# Patient Record
Sex: FEMALE | Race: WHITE | NOT HISPANIC OR LATINO | URBAN - METROPOLITAN AREA
[De-identification: names, ages, dates, MRNs, and addresses within clinical notes are randomized per-mention and may not be internally consistent; named-entity substitution may affect disease eponyms.]

---

## 2024-07-01 ENCOUNTER — APPOINTMENT (OUTPATIENT)
Dept: RADIOLOGY | Facility: CLINIC | Age: 74
End: 2024-07-01
Payer: MEDICARE

## 2024-07-01 ENCOUNTER — APPOINTMENT (OUTPATIENT)
Dept: ORTHOPEDIC SURGERY | Facility: CLINIC | Age: 74
End: 2024-07-01
Payer: MEDICARE

## 2024-07-01 DIAGNOSIS — G89.29 CHRONIC PAIN OF RIGHT KNEE: Primary | ICD-10-CM

## 2024-07-01 DIAGNOSIS — M25.561 CHRONIC PAIN OF RIGHT KNEE: Primary | ICD-10-CM

## 2025-02-26 ENCOUNTER — APPOINTMENT (OUTPATIENT)
Dept: ORTHOPEDIC SURGERY | Facility: CLINIC | Age: 75
End: 2025-02-26
Payer: MEDICARE

## 2025-02-27 ENCOUNTER — OFFICE VISIT (OUTPATIENT)
Dept: ORTHOPEDIC SURGERY | Facility: CLINIC | Age: 75
End: 2025-02-27
Payer: MEDICARE

## 2025-02-27 ENCOUNTER — HOSPITAL ENCOUNTER (OUTPATIENT)
Dept: RADIOLOGY | Facility: CLINIC | Age: 75
Discharge: HOME | End: 2025-02-27
Payer: MEDICARE

## 2025-02-27 VITALS — HEIGHT: 60 IN | WEIGHT: 118 LBS | BODY MASS INDEX: 23.16 KG/M2

## 2025-02-27 DIAGNOSIS — M25.561 CHRONIC PAIN OF RIGHT KNEE: ICD-10-CM

## 2025-02-27 DIAGNOSIS — R73.9 HYPERGLYCEMIA: ICD-10-CM

## 2025-02-27 DIAGNOSIS — G89.29 CHRONIC PAIN OF RIGHT KNEE: Primary | ICD-10-CM

## 2025-02-27 DIAGNOSIS — M25.561 CHRONIC PAIN OF RIGHT KNEE: Primary | ICD-10-CM

## 2025-02-27 DIAGNOSIS — G89.29 CHRONIC PAIN OF RIGHT KNEE: ICD-10-CM

## 2025-02-27 PROCEDURE — 73564 X-RAY EXAM KNEE 4 OR MORE: CPT | Mod: RT

## 2025-02-27 PROCEDURE — 3008F BODY MASS INDEX DOCD: CPT | Performed by: STUDENT IN AN ORGANIZED HEALTH CARE EDUCATION/TRAINING PROGRAM

## 2025-02-27 PROCEDURE — 99205 OFFICE O/P NEW HI 60 MIN: CPT | Performed by: STUDENT IN AN ORGANIZED HEALTH CARE EDUCATION/TRAINING PROGRAM

## 2025-02-27 ASSESSMENT — PAIN SCALES - GENERAL: PAINLEVEL_OUTOF10: 5 - MODERATE PAIN

## 2025-02-27 ASSESSMENT — PAIN - FUNCTIONAL ASSESSMENT: PAIN_FUNCTIONAL_ASSESSMENT: 0-10

## 2025-02-27 NOTE — PROGRESS NOTES
HOWARD/TKA Related Summary           L hip: N  L knee: N  R hip: N  R knee: N  Lumbar surgery or significant pathology: N            CC/SUBJECTIVE/HPI            PCP: No primary care provider on file.  Referring provider: No ref. provider found  Bharati Bender is a 74 y.o. female presenting for R knee pain.  This pain has been going on for many years, and she has been following at a neighboring hospital for a variety of conservative treatments.  Unfortunately, these are no longer providing relief.  At this point, the pain is limiting activities of daily living and hobbies.  She enjoys hiking and exercising and is not able to do these without significant pain.    R knee  Symptoms  Pain: 7/10  Onset: chronic/gradual  Duration: >2yrs  Location: all over  Quality: catch/lock and radiate  Limitations: morning stiffness, pain after activity, limp, night pain, difficulty with stairs, difficulty in/out of chair/car, and difficulty with socks/shoes/clothes  Ambulation limit due to pain: >500ft but hurts  Other symptoms: none  Treatment  Tried: activity modification, bracing, PT, home exercises, ice/heat, topical gel (ie Voltaren), OTCs, and CSI  Most recent injection <3mo ago? Y (1/16/2025)  Assistive device: none  Treatment attempted for >2yrs and is no longer effective            HISTORIES (System Generated and Pt Reported on Form Today)       Dental  Pt reported: No active issues     PMH  Pt reported: Denies heart/lung/kidney/liver issues, DM, stroke, seizure, bariatric surgery, anticoag, MRSA, cancer, personal/familial coagulopathies except: none in addition to below  System generated (PMH, problem list both included since EMR change caused discrepancies): No past medical history on file. There is no problem list on file for this patient.  Review of outside records  Esophageal Reflux  Enthesopathy of Wrist and Carpus  Primary Localized Osteoarthrosis, Hand  Hiatal Hernia  Other Seborrheic Keratosis  Mixed  Hyperlipidemia  Primary Osteoarthritis of First Carpometacarpal Joint of Left Hand  Obstructive Sleep Apnea  Change in Bowel Habits  Diverticulosis of Large Intestine Without Hemorrhage  Irritable Bowel Syndrome  Osteopenia  Osteoporosis  Primary Osteoarthritis of Right Knee  Acquired Genu Valgum of Right Knee  Nuclear Senile Cataract of Both Eyes  Cortical Age-Related Cataract, Bilateral  Papilloma of Left Lower Eyelid  Preop Examination    PSH  Pt reported: Per above.   System generated: No past surgical history on file.  Review of outside records: Noncontributory    Family Hx  Pt reported clot/coagulopathies: none  Review of outside records: Noncontributory    Social Hx  Pt reported substance use: N  System generated:    Review of outside records: Social EtOH, otherwise negative    Allergies  Pt reported (meds, metals): Bactrim (rash)  System generated: Not on File  Review of outside records: Sulfa, pollen.    Current Meds  System generated: No current outpatient medications on file.  Review of outside records: Voltaren gel, omeprazole, lovastatin    ROS: Neg except HPI            OBJECTIVE            Physical exam  There is no height or weight on file to calculate BMI.  Gen/psych: NAD, conversational, appropriate    Ambulation  Gait: Mildly antalgic  Assistive device: none  Spine  Lumbar tenderness: neg  Limited ROM: neg, with no radiation or pain with flex/ex, lateral bending  SLR test: neg    Focused MSK exam: R  Knee  Skin/incision: normal  Effusion: mild  Alignment: valgus (partially correctable)  Knee tenderness: diffuse  Crepitation: moderate  Extension: passive flexion contracture 0-5° and active extension lag 0°  Flexion: 115°  Anterior drawer: stable  Posterior drawer: stable  Varus/Valgus in extension: stable  Varus/Valgus in flexion: stable  Referred pain to knee with hip 90° flexion and 45° ER/IR: neg  Neurovascular    Strength: 5/5 hip/knee/ankle flexion and extension  Sensory (L2-S1): SILT  throughout lower extremity  Edema/stasis: no pitting edema  Pulse: DP 1+, PT 1+      Imaging  2/27/2025 R knee radiographs ([facility], WB AP/lateral, WB AP flexion) on my read: Joint space narrowing (medial tibiofemoral mild, lateral tibiofemoral severe, patellofemoral moderate) with osteophytes and sclerosis. Limb alignment valgus (partially correctable).            ASSESSMENT & PLAN           Patient verbalized understanding of below A&P. All questions answered.  R knee DJD  Differential includes radicular pain from spine or referred pain from hip. H&P most consistent with knee origin.  General information  Evaluation, imaging, diagnosis, and treatment options (conservative and surgical as well as risks, benefits, recovery, and outcomes) discussed. Conservative options should be maximized and include activity modification, bracing, weight loss, PT, home exercise, local pain control (ice, heat, topicals), OTCs, and assistive devices. Once exhausted, injections may provide relief. If these fail to improve pain, function, and quality of life, elective arthroplasty may be an option.  Shared decision making   Correction of valgus deformity carries risk of nerve palsy.  Considering R primary TKA. Given extensive conservative treatments tried, continued pain, and effect on quality of life, I agree this would be a reasonable decision. Would be a good arthroplasty candidate..  Follow-up: As needed  PMH, PSH, other considerations discussed  Osteoporosis  Bactrim allergy (rash)  SDD candidate  Occupation:   Hobbies: Reading  PCP: Not assigned

## 2025-03-01 LAB
ALBUMIN SERPL-MCNC: 4.3 G/DL (ref 3.6–5.1)
EST. AVERAGE GLUCOSE BLD GHB EST-MCNC: 120 MG/DL
EST. AVERAGE GLUCOSE BLD GHB EST-SCNC: 6.6 MMOL/L
HBA1C MFR BLD: 5.8 % OF TOTAL HGB
HCT VFR BLD AUTO: 41.3 % (ref 35–45)
HGB BLD-MCNC: 13.9 G/DL (ref 11.7–15.5)

## 2025-04-01 PROBLEM — M17.11 ARTHRITIS OF RIGHT KNEE: Status: ACTIVE | Noted: 2025-08-13

## 2025-05-02 ENCOUNTER — EVALUATION (OUTPATIENT)
Dept: PHYSICAL THERAPY | Facility: CLINIC | Age: 75
End: 2025-05-02
Payer: MEDICARE

## 2025-05-02 DIAGNOSIS — M25.561 CHRONIC PAIN OF RIGHT KNEE: Primary | ICD-10-CM

## 2025-05-02 DIAGNOSIS — G89.29 CHRONIC PAIN OF RIGHT KNEE: Primary | ICD-10-CM

## 2025-05-02 DIAGNOSIS — M17.11 ARTHRITIS OF RIGHT KNEE: ICD-10-CM

## 2025-05-02 DIAGNOSIS — M62.81 MUSCLE WEAKNESS: ICD-10-CM

## 2025-05-02 PROCEDURE — 97161 PT EVAL LOW COMPLEX 20 MIN: CPT | Mod: GP | Performed by: PHYSICAL THERAPIST

## 2025-05-02 PROCEDURE — 97110 THERAPEUTIC EXERCISES: CPT | Mod: GP | Performed by: PHYSICAL THERAPIST

## 2025-05-02 ASSESSMENT — ENCOUNTER SYMPTOMS
LOSS OF SENSATION IN FEET: 0
DEPRESSION: 0
OCCASIONAL FEELINGS OF UNSTEADINESS: 0

## 2025-05-02 NOTE — PROGRESS NOTES
Physical Therapy    Physical Therapy Evaluation and Treatment      Patient Name: Bharati Bender  MRN: [id number]  Today's Date: 5/2/2025  Time Entry:   Time Calculation  Start Time: 0900  Stop Time: 0945  Time Calculation (min): 45 min  PT Evaluation Time Entry  PT Evaluation (Low) Time Entry: 30  PT Therapeutic Procedures Time Entry  Therapeutic Exercise Time Entry: 10     Insurance: Medicare and MMO [place], no auth  Medicare Certification: 5/2/25 - 7/31/25  Visit #1    Assessment:  Bharati is a 74 y.o. female presenting with chronic R knee pain secondary to severe arthritic changes. Exam shows R genu valgus deformity, moderate edema, decreased R>L lower extremity strength, decreased length in R hamstrings and mild gait impairments. Pt would benefit from skilled PT to address these impairments in preparation for TKA surgery in August.      Plan:  OP PT Plan  Treatment/Interventions: Therapeutic exercises, Manual therapy, Dry needling, Electrical stimulation, Cryotherapy, Hot pack, Education/ Instruction, Vasopneumatic device  PT Plan: Skilled PT  PT Frequency:  (1 time per week to every other week)  Duration: 6-8 visits  Onset Date: 04/01/25  Certification Period Start Date: 05/02/25  Certification Period End Date: 07/31/25  Number of Treatments Authorized: [place]  Rehab Potential: Good  Plan of Care Agreement: Patient      Problem List Items Addressed This Visit           ICD-10-CM    Arthritis of right knee M17.11    Relevant Orders    Follow Up In Physical Therapy    Chronic pain of right knee - Primary M25.561, G89.29    Relevant Orders    Follow Up In Physical Therapy    Muscle weakness M62.81    Relevant Orders    Follow Up In Physical Therapy       General:  Reason for Referral: R knee pain  Referred By: Ton Sarabia MD  Preferred Learning Style: verbal, visual, written    Subjective   Bharati c/o chronic R knee pain for at least the last 4-5 years secondary to arthritic changes. She saw ortho  back in February, had x-rays which showed “Severe lateral compartment osteoarthritis. Genu valgum deformity.” She elected TKA and is scheduled for surgery on 8/13/25. She has been using ice constantly. She is taking Tylenol most of the time and ibuprofen when severe. Pain is aggravated with prolonged sitting and she changes positions frequently at night secondary to pain/discomfort. She does not feel particularly limited in any specific activity but has found ways to compensate and feels she does things slower. PMHx: sleep apnea, osteopenia    Pt Goals: “Get the leg stronger to make therapy easier for after surgery.”    Precautions:  Precautions  STEADI Fall Risk Score (The score of 4 or more indicates an increased risk of falling): 0    Pain:  R lateral knee 2/10 currently, 10/10 at worst, pain can be achy, sharp, dull    Home Living:  Lives with , flight to stairs down to laundry, flight of stairs to second level, bed/bath on main level    Prior Level of Function:  Independent in all ADLs. Pt is retired.    Objective   Palpation: (-) TTP to R knee structures    Observation: R genu valgus deformity, moderate edema    Gait: slightly decreased R LE stance time    Single leg balance: R- 15 seconds; L- 3 seconds    Knee AROM (R/L in degrees): 4-0-140; 3-0-140    MMT (R/L):   Iliopsoas- 3+/5; 4+/5  Hip Abd- 3+/5; 4/5  Glute max- 4/5; 3+/5  Quadriceps- 4+/5; 5/5  Hamstrings- 4+/5; 4+/5    Length tests (R/L in degrees):  90/90 hamstrings- (40) / (30)    Outcome Measures:  LEFS: 62/80    Treatments:  Therapeutic Exercise:  SLR x 10 R  SAQ x 10 w/ 5 sec hold R  LSLR x 10 R  Bridges x 10 w/ 5 sec hold    EDUCATION:  Access Code: [email or web address]  URL: [email or web address]  Date: 05/02/2025  Prepared by: Augustin Ruano    Exercises  - Active Straight Leg Raise with Quad Set  - 1 x daily - 7 x weekly - 1-3 sets - 10 reps  - Supine Knee Extension Strengthening  - 1 x daily - 7 x weekly - 1-3 sets - 10  reps - 5 seconds hold  - Sidelying Hip Abduction  - 1 x daily - 7 x weekly - 1-3 sets - 10 reps  - Bridge  - 1 x daily - 7 x weekly - 1-3 sets - 10 reps - 5 seconds hold    Goals:  Active       PT Problem       PT Goal 1       Start:  05/02/25    Expected End:  07/01/25       Increase R quadriceps and hamstrings MMT to 5/5 and B iliopsoas, hip abd and glute max MMT to at least 4+/5.         PT Goal 2       Start:  05/02/25    Expected End:  07/01/25       Increase length in R hamstrings by 10 degrees.         PT Goal 3       Start:  05/02/25    Expected End:  07/01/25       Pt will amb with improved R LE stance time.         PT Goal 4       Start:  05/02/25    Expected End:  07/01/25       Pt will report 50% improvement in R knee pain to improve sitting, sleeping and general ADLs.         PT Goal 5       Start:  05/02/25    Expected End:  07/01/25       Improve LEFS by 5 points or more.         PT Goal 6       Start:  05/02/25    Expected End:  07/01/25       Pt will demonstrate independence with HEP.

## 2025-05-21 ENCOUNTER — TREATMENT (OUTPATIENT)
Dept: PHYSICAL THERAPY | Facility: CLINIC | Age: 75
End: 2025-05-21
Payer: MEDICARE

## 2025-05-21 DIAGNOSIS — G89.29 CHRONIC PAIN OF RIGHT KNEE: Primary | ICD-10-CM

## 2025-05-21 DIAGNOSIS — M25.561 CHRONIC PAIN OF RIGHT KNEE: Primary | ICD-10-CM

## 2025-05-21 DIAGNOSIS — M17.11 ARTHRITIS OF RIGHT KNEE: ICD-10-CM

## 2025-05-21 DIAGNOSIS — M62.81 MUSCLE WEAKNESS: ICD-10-CM

## 2025-05-21 PROCEDURE — 97110 THERAPEUTIC EXERCISES: CPT | Mod: GP | Performed by: PHYSICAL THERAPIST

## 2025-05-21 NOTE — PROGRESS NOTES
"Physical Therapy    Physical Therapy Treatment    Patient Name: Bharati Bender  MRN: [id number]  Today's Date: 5/21/2025    Time Entry:   Time Calculation  Start Time: 1530  Stop Time: 1615  Time Calculation (min): 45 min     PT Therapeutic Procedures Time Entry  Therapeutic Exercise Time Entry: 40       Insurance: Medicare and MMO [place], [place] auth  Medicare Certification: 5/2/25 - 7/31/25  Visit #2    Assessment:  Pt needs cues to reset quads between reps of SLR. Min form corrections needed with all home exercises.    Plan:  Standing 4-way hip.    Problem List Items Addressed This Visit           ICD-10-CM    Arthritis of right knee M17.11    Chronic pain of right knee - Primary M25.561, G89.29    Muscle weakness M62.81       Subjective    \"Knee is sore depending on what I'm doing. Feels stiff/achy today I think because of the weather.\"    Precautions: osteopenia  STEADI Fall Risk Score (The score of 4 or more indicates an increased risk of falling): 0    Pain  R knee 3/10    Objective   From Eval:   MMT (R/L):   Iliopsoas- 3+/5; 4+/5  Hip Abd- 3+/5; 4/5  Glute max- 4/5; 3+/5  Quadriceps- 4+/5; 5/5  Hamstrings- 4+/5; 4+/5    Treatments:  Therapeutic Exercise:  SRC x 5 mins  SLR 2x10 R  SAQ 2x10 w/ 5 sec hold R  Bridges 2x10 w/ 5 sec hold  LSLR 2x10 R  TKE, green band, 2x10 w/ 5 sec hold R  Leg press 50# 2x10  HSC 11# 2x10    Goals:  Active       PT Problem       PT Goal 1       Start:  05/02/25    Expected End:  07/01/25       Increase R quadriceps and hamstrings MMT to 5/5 and B iliopsoas, hip abd and glute max MMT to at least 4+/5.         PT Goal 2       Start:  05/02/25    Expected End:  07/01/25       Increase length in R hamstrings by 10 degrees.         PT Goal 3       Start:  05/02/25    Expected End:  07/01/25       Pt will amb with improved R LE stance time.         PT Goal 4       Start:  05/02/25    Expected End:  07/01/25       Pt will report 50% improvement in R knee pain to improve " sitting, sleeping and general ADLs.         PT Goal 5       Start:  05/02/25    Expected End:  07/01/25       Improve LEFS by 5 points or more.         PT Goal 6       Start:  05/02/25    Expected End:  07/01/25       Pt will demonstrate independence with HEP.

## 2025-05-29 ENCOUNTER — TREATMENT (OUTPATIENT)
Dept: PHYSICAL THERAPY | Facility: CLINIC | Age: 75
End: 2025-05-29
Payer: MEDICARE

## 2025-05-29 DIAGNOSIS — M25.561 CHRONIC PAIN OF RIGHT KNEE: Primary | ICD-10-CM

## 2025-05-29 DIAGNOSIS — M17.11 ARTHRITIS OF RIGHT KNEE: ICD-10-CM

## 2025-05-29 DIAGNOSIS — M62.81 MUSCLE WEAKNESS: ICD-10-CM

## 2025-05-29 DIAGNOSIS — G89.29 CHRONIC PAIN OF RIGHT KNEE: Primary | ICD-10-CM

## 2025-05-29 PROCEDURE — 97110 THERAPEUTIC EXERCISES: CPT | Mod: GP | Performed by: PHYSICAL THERAPIST

## 2025-05-29 NOTE — PROGRESS NOTES
Physical Therapy    Physical Therapy Treatment    Patient Name: Bharati Bender  MRN: [id number]  Today's Date: 5/29/2025    Time Entry:   Time Calculation  Start Time: 0915  Stop Time: 1000  Time Calculation (min): 45 min     PT Therapeutic Procedures Time Entry  Therapeutic Exercise Time Entry: 40    Insurance: Medicare and MMO [place], [place]  Medicare Certification: 5/2/25 - 7/31/25  Visit #3    Assessment:  Recheck of R iliopsoas MMT shows improvement in strength since initial visit. Pt needs constant UE support for balance/stability during standing 4-way hip.    Plan:  Seated hamstring stretch, step ups.    Problem List Items Addressed This Visit           ICD-10-CM    Arthritis of right knee M17.11    Chronic pain of right knee - Primary M25.561, G89.29    Muscle weakness M62.81       Subjective    Pt reports stiffness this morning. She iced before she came here today.    Precautions: osteopenia  STEADI Fall Risk Score (The score of 4 or more indicates an increased risk of falling): 0    Pain  R knee 4/10    Objective   MMT (R/L):   Iliopsoas- 4/5; 4+/5    Treatments:  Therapeutic Exercise:  SRC x 5 mins  SLR 2x10 R  SAQ 2x10 w/ 5 sec hold R  Bridges w/ hip abd, red band, 2x10 w/ 5 sec hold  LSLR 2x10 R  TKE, green band, 2x10 w/ 5 sec hold R  Leg press 50# 2x10  HSC 11# 2x10  Standing 4-way hip, red band, x 10 ea R    OP EDUCATION:  Added band hip abd to bridges for HEP.    Goals:  Active       PT Problem       PT Goal 1       Start:  05/02/25    Expected End:  07/01/25       Increase R quadriceps and hamstrings MMT to 5/5 and B iliopsoas, hip abd and glute max MMT to at least 4+/5.         PT Goal 2       Start:  05/02/25    Expected End:  07/01/25       Increase length in R hamstrings by 10 degrees.         PT Goal 3       Start:  05/02/25    Expected End:  07/01/25       Pt will amb with improved R LE stance time.         PT Goal 4       Start:  05/02/25    Expected End:  07/01/25       Pt  will report 50% improvement in R knee pain to improve sitting, sleeping and general ADLs.         PT Goal 5       Start:  05/02/25    Expected End:  07/01/25       Improve LEFS by 5 points or more.         PT Goal 6       Start:  05/02/25    Expected End:  07/01/25       Pt will demonstrate independence with HEP.

## 2025-06-04 ENCOUNTER — TREATMENT (OUTPATIENT)
Dept: PHYSICAL THERAPY | Facility: CLINIC | Age: 75
End: 2025-06-04
Payer: MEDICARE

## 2025-06-04 DIAGNOSIS — M62.81 MUSCLE WEAKNESS: ICD-10-CM

## 2025-06-04 DIAGNOSIS — M17.11 ARTHRITIS OF RIGHT KNEE: ICD-10-CM

## 2025-06-04 DIAGNOSIS — G89.29 CHRONIC PAIN OF RIGHT KNEE: ICD-10-CM

## 2025-06-04 DIAGNOSIS — M25.561 CHRONIC PAIN OF RIGHT KNEE: ICD-10-CM

## 2025-06-04 PROCEDURE — 97110 THERAPEUTIC EXERCISES: CPT | Mod: GP,CQ

## 2025-06-04 NOTE — PROGRESS NOTES
"Physical Therapy    Physical Therapy Treatment    Patient Name: Bharati Bender  MRN: [id number]  Today's Date: 6/4/2025    Time Entry:   Time Calculation  Start Time: 0145  Stop Time: 0220  Time Calculation (min): 35 min     PT Therapeutic Procedures Time Entry  Therapeutic Exercise Time Entry: 35    Insurance: Medicare and MMO [place], [place]  Medicare Certification: 5/2/25 - 7/31/25  Visit #4    Assessment:  Recheck of R iliopsoas MMT shows improvement in strength since initial visit. Pt needs constant UE support for balance/stability during standing 4-way hip.    Plan:  Squats w/ resistance band for H abd    Problem List Items Addressed This Visit           ICD-10-CM    Arthritis of right knee M17.11    Chronic pain of right knee M25.561, G89.29    Muscle weakness M62.81       Subjective    \"I had an appointment this morning at [place], and I had to walk a lot.  I came home and iced it a few minutes, but it's still more sore than last time.\"    Precautions: osteopenia  STEADI Fall Risk Score (The score of 4 or more indicates an increased risk of falling): 0    Pain  R knee 6/10, 2/10 EOS    Objective   MMT (R/L):   Iliopsoas- 4/5; 4+/5    Treatments:  Therapeutic Exercise:  QS x 20 ea  SLR 2x10 R  SAQ 2x10 w/ 5 sec hold R, not today  Bridges w/ hip abd, red band, 2x10 w/ 5 sec hold  LSLR 2x10 R  HSC x 20, green TB  TKE, black band, 2x10 w/ 5 sec hold R  Leg press 50# 2x10  HSC 11# 2x10  Standing 4-way hip, red band, x 10 ea R  4\" step ups x 10 ea  SRB x 5 min    OP EDUCATION:  Added band hip abd to bridges for HEP.    Goals:  Active       PT Problem       PT Goal 1       Start:  05/02/25    Expected End:  07/01/25       Increase R quadriceps and hamstrings MMT to 5/5 and B iliopsoas, hip abd and glute max MMT to at least 4+/5.         PT Goal 2       Start:  05/02/25    Expected End:  07/01/25       Increase length in R hamstrings by 10 degrees.         PT Goal 3       Start:  05/02/25    " Expected End:  07/01/25       Pt will amb with improved R LE stance time.         PT Goal 4       Start:  05/02/25    Expected End:  07/01/25       Pt will report 50% improvement in R knee pain to improve sitting, sleeping and general ADLs.         PT Goal 5       Start:  05/02/25    Expected End:  07/01/25       Improve LEFS by 5 points or more.         PT Goal 6       Start:  05/02/25    Expected End:  07/01/25       Pt will demonstrate independence with HEP.

## 2025-06-11 ENCOUNTER — TREATMENT (OUTPATIENT)
Dept: PHYSICAL THERAPY | Facility: CLINIC | Age: 75
End: 2025-06-11
Payer: MEDICARE

## 2025-06-11 DIAGNOSIS — M25.561 CHRONIC PAIN OF RIGHT KNEE: ICD-10-CM

## 2025-06-11 DIAGNOSIS — M17.11 ARTHRITIS OF RIGHT KNEE: ICD-10-CM

## 2025-06-11 DIAGNOSIS — G89.29 CHRONIC PAIN OF RIGHT KNEE: ICD-10-CM

## 2025-06-11 DIAGNOSIS — M62.81 MUSCLE WEAKNESS: ICD-10-CM

## 2025-06-11 PROCEDURE — 97110 THERAPEUTIC EXERCISES: CPT | Mod: GP,CQ

## 2025-06-11 NOTE — PROGRESS NOTES
"Physical Therapy    Physical Therapy Treatment    Patient Name: Bharati Bender  MRN: [id number]  Today's Date: 6/11/2025    Time Entry:   Time Calculation  Start Time: 0923  Stop Time: 1001  Time Calculation (min): 38 min     PT Therapeutic Procedures Time Entry  Therapeutic Exercise Time Entry: 38    Insurance: Medicare and MMO [place], [place]  Medicare Certification: 5/2/25 - 7/31/25  Visit #5    Assessment:  Pt did well w/ resisted walking in cable column, VC's for neutral LE positioning on Nu Step    Plan:  Squats w/ resistance band for H abd    Problem List Items Addressed This Visit           ICD-10-CM    Arthritis of right knee M17.11    Chronic pain of right knee M25.561, G89.29    Muscle weakness M62.81       Subjective    \"R knee is back down to baseline, 2/10.  Some days it feels stronger and I have gone for a walk after I do the ex's and felt OK.\"    Precautions: osteopenia  STEADI Fall Risk Score (The score of 4 or more indicates an increased risk of falling): 0    Pain  R knee 2/10, 0/10 EOS    Objective   MMT (R/L):   Iliopsoas- 4/5; 4+/5    Treatments:  Therapeutic Exercise:  QS x 20 ea  SLR 2x10 R  SAQ 2x10 w/ 5 sec hold R, not today  Bridges w/ hip abd, green band, 2x10 w/ 5 sec hold  LSLR 2x10 R  HSC x 20, green TB  LAQ #2 x 20  TKE, black band, 2x10 w/ 5 sec hold R  Leg press 60# 2x10  HSC 11# 3x10  Cable column 2p x 5 ea dir  4\" step ups x 20 ea  Nu Step 1.5 x 5 min    OP EDUCATION:  Added band hip abd to bridges for HEP.    Goals:  Active       PT Problem       PT Goal 1       Start:  05/02/25    Expected End:  07/01/25       Increase R quadriceps and hamstrings MMT to 5/5 and B iliopsoas, hip abd and glute max MMT to at least 4+/5.         PT Goal 2       Start:  05/02/25    Expected End:  07/01/25       Increase length in R hamstrings by 10 degrees.         PT Goal 3       Start:  05/02/25    Expected End:  07/01/25       Pt will amb with improved R LE stance time.         PT " Goal 4       Start:  05/02/25    Expected End:  07/01/25       Pt will report 50% improvement in R knee pain to improve sitting, sleeping and general ADLs.         PT Goal 5       Start:  05/02/25    Expected End:  07/01/25       Improve LEFS by 5 points or more.         PT Goal 6       Start:  05/02/25    Expected End:  07/01/25       Pt will demonstrate independence with HEP.

## 2025-06-18 ENCOUNTER — TREATMENT (OUTPATIENT)
Dept: PHYSICAL THERAPY | Facility: CLINIC | Age: 75
End: 2025-06-18
Payer: MEDICARE

## 2025-06-18 DIAGNOSIS — M17.11 ARTHRITIS OF RIGHT KNEE: ICD-10-CM

## 2025-06-18 DIAGNOSIS — M62.81 MUSCLE WEAKNESS: ICD-10-CM

## 2025-06-18 DIAGNOSIS — M25.561 CHRONIC PAIN OF RIGHT KNEE: ICD-10-CM

## 2025-06-18 DIAGNOSIS — G89.29 CHRONIC PAIN OF RIGHT KNEE: ICD-10-CM

## 2025-06-18 PROCEDURE — 97110 THERAPEUTIC EXERCISES: CPT | Mod: GP,CQ

## 2025-06-18 NOTE — PROGRESS NOTES
"Physical Therapy    Physical Therapy Treatment    Patient Name: Bharati Bender  MRN: [id number]  Today's Date: 6/18/2025    Time Entry:   Time Calculation  Start Time: 0916  Stop Time: 0955  Time Calculation (min): 39 min     PT Therapeutic Procedures Time Entry  Therapeutic Exercise Time Entry: 39    Insurance: Medicare and MMO [place], [place] auth  Medicare Certification: 5/2/25 - 7/31/25  Visit #6    Assessment:  Pt was able to add resistance to PRE's    Plan:  Squats w/ resistance band for H abd    Problem List Items Addressed This Visit           ICD-10-CM    Arthritis of right knee M17.11    Chronic pain of right knee M25.561, G89.29    Muscle weakness M62.81       Subjective    \"R knee is less uncomfortable at night, I am sleeping better.\"    Precautions: osteopenia  STEADI Fall Risk Score (The score of 4 or more indicates an increased risk of falling): 0    Pain  R knee 2/10, 0/10 EOS    Objective   MMT (R/L):   Iliopsoas- 4/5; 4+/5    Treatments:  Therapeutic Exercise:  QS x 20 ea  SLR #2 2x10 R  SAQ 2x10 w/ 5 sec hold R, #2  Bridges w/ hip abd, green band, 2x10 w/ 5 sec hold  LSLR, #2 2x10 R  HSC x 20, green TB  LAQ #2 x 20  Green TB lat amb x 3, monster walks x 3  TKE, black band, 2x10 w/ 5 sec hold R, not today  Leg press 60# 2x10, occupied  HSC 11# 3x10  KE #11 3x10  Cable Column 2p fwd/retro x 5 ea  Sit to stand black bench x 20  Nu Step 1.5 x 5 min, not today    OP EDUCATION:  Added band hip abd to bridges for HEP.    Goals:  Active       PT Problem       PT Goal 1       Start:  05/02/25    Expected End:  07/01/25       Increase R quadriceps and hamstrings MMT to 5/5 and B iliopsoas, hip abd and glute max MMT to at least 4+/5.         PT Goal 2       Start:  05/02/25    Expected End:  07/01/25       Increase length in R hamstrings by 10 degrees.         PT Goal 3       Start:  05/02/25    Expected End:  07/01/25       Pt will amb with improved R LE stance time.         PT Goal 4       " Start:  05/02/25    Expected End:  07/01/25       Pt will report 50% improvement in R knee pain to improve sitting, sleeping and general ADLs.         PT Goal 5       Start:  05/02/25    Expected End:  07/01/25       Improve LEFS by 5 points or more.         PT Goal 6       Start:  05/02/25    Expected End:  07/01/25       Pt will demonstrate independence with HEP.

## 2025-06-25 ENCOUNTER — TREATMENT (OUTPATIENT)
Dept: PHYSICAL THERAPY | Facility: CLINIC | Age: 75
End: 2025-06-25
Payer: MEDICARE

## 2025-06-25 DIAGNOSIS — M25.561 CHRONIC PAIN OF RIGHT KNEE: ICD-10-CM

## 2025-06-25 DIAGNOSIS — G89.29 CHRONIC PAIN OF RIGHT KNEE: ICD-10-CM

## 2025-06-25 DIAGNOSIS — M17.11 ARTHRITIS OF RIGHT KNEE: ICD-10-CM

## 2025-06-25 DIAGNOSIS — M62.81 MUSCLE WEAKNESS: ICD-10-CM

## 2025-06-25 PROCEDURE — 97110 THERAPEUTIC EXERCISES: CPT | Mod: GP,CQ

## 2025-06-25 NOTE — PROGRESS NOTES
"Physical Therapy    Physical Therapy Treatment    Patient Name: Bharati Bender  MRN: [id number]  Today's Date: 6/25/2025    Time Entry:   Time Calculation  Start Time: 0915  Stop Time: 0940  Time Calculation (min): 25 min     PT Therapeutic Procedures Time Entry  Therapeutic Exercise Time Entry: 25    Insurance: Medicare and [employer] [place], no auth  Medicare Certification: 5/2/25 - 7/31/25  Visit #7    Assessment:  Decreased rx 2` another commitment. Pt was able to add lateral steps to cable column.    Plan:  Increase weight on HSC machine  Squats w/ resistance band for H abd    Problem List Items Addressed This Visit           ICD-10-CM    Arthritis of right knee M17.11    Chronic pain of right knee M25.561, G89.29    Muscle weakness M62.81       Subjective    \"I need to leave early, there is an emergency meeting for my brother in a nursing home.\"    Precautions: osteopenia  STEADI Fall Risk Score (The score of 4 or more indicates an increased risk of falling): 0    Pain  R knee 2/10, 0/10 EOS    Objective   MMT (R/L):   Iliopsoas- 4/5; 4+/5    Treatments:  Therapeutic Exercise:  Not today:  QS x 20 ea  SAQ 2x10 w/ 5 sec hold R, #2  Bridges w/ hip abd, green band, 2x10 w/ 5 sec hold  LSLR, #2 2x10 R  HSC x 20, green TB  LAQ #2 x 20  Green TB lat amb x 3, monster walks x 3  TKE, black band, 2x10 w/ 5 sec hold R, not today    Today:  SLR in ER #2 x 20  Leg press 60# 2x10  HSC 11# 2x10  KE #11 2x10  Cable Column 2p fwd/retro/laterals x 7 ea  Sit to stand black bench x 20  SRB x 5 min, seat 5    OP EDUCATION:  Added band hip abd to bridges for HEP.    Goals:  Active       PT Problem       PT Goal 1       Start:  05/02/25    Expected End:  07/01/25       Increase R quadriceps and hamstrings MMT to 5/5 and B iliopsoas, hip abd and glute max MMT to at least 4+/5.         PT Goal 2       Start:  05/02/25    Expected End:  07/01/25       Increase length in R hamstrings by 10 degrees.         PT Goal 3       " Start:  05/02/25    Expected End:  07/01/25       Pt will amb with improved R LE stance time.         PT Goal 4       Start:  05/02/25    Expected End:  07/01/25       Pt will report 50% improvement in R knee pain to improve sitting, sleeping and general ADLs.         PT Goal 5       Start:  05/02/25    Expected End:  07/01/25       Improve LEFS by 5 points or more.         PT Goal 6       Start:  05/02/25    Expected End:  07/01/25       Pt will demonstrate independence with HEP.

## 2025-07-02 ENCOUNTER — APPOINTMENT (OUTPATIENT)
Dept: PHYSICAL THERAPY | Facility: CLINIC | Age: 75
End: 2025-07-02
Payer: MEDICARE

## 2025-07-09 ENCOUNTER — TREATMENT (OUTPATIENT)
Dept: PHYSICAL THERAPY | Facility: CLINIC | Age: 75
End: 2025-07-09
Payer: MEDICARE

## 2025-07-09 DIAGNOSIS — M25.561 CHRONIC PAIN OF RIGHT KNEE: ICD-10-CM

## 2025-07-09 DIAGNOSIS — G89.29 CHRONIC PAIN OF RIGHT KNEE: ICD-10-CM

## 2025-07-09 DIAGNOSIS — M17.11 ARTHRITIS OF RIGHT KNEE: ICD-10-CM

## 2025-07-09 DIAGNOSIS — M62.81 MUSCLE WEAKNESS: ICD-10-CM

## 2025-07-09 PROCEDURE — 97110 THERAPEUTIC EXERCISES: CPT | Mod: GP | Performed by: PHYSICAL THERAPIST

## 2025-07-09 NOTE — PROGRESS NOTES
"Physical Therapy    Physical Therapy Treatment    Patient Name: Bharati Bender  MRN: [id number]  Today's Date: 7/9/2025    Time Entry:   Time Calculation  Start Time: 0900  Stop Time: 0950  Time Calculation (min): 50 min     PT Therapeutic Procedures Time Entry  Therapeutic Exercise Time Entry: 40    Insurance: Medicare and [employer] [place], [place] auth  Medicare Certification: 5/2/25 - 7/31/25  Visit #8    Assessment:  Luisa has shown improvement in hip/knee MMT, hamstring length and gait pattern since beginning PT. She has met several goals and would like to continue with her home exercises independently until she undergoes TKA on 8/13.    Plan:  Discharge to [facility] which pt will continue until she undergoes TKA next month.    Problem List Items Addressed This Visit           ICD-10-CM    Arthritis of right knee M17.11    Chronic pain of right knee M25.561, G89.29    Muscle weakness M62.81       Subjective    \"Pain has been on and off. I iced it this morning so it doesn't hurt right now. I feel like today can be my last visit. I'm doing my home exercises. I have my knee replacement surgery on 8/13.\"    Precautions: osteopenia  STEADI Fall Risk Score (The score of 4 or more indicates an increased risk of falling): 0    Pain  R knee 0/10, 0/10 EOS    Objective   Palpation: (-) TTP to R knee structures     Observation: R genu valgus deformity, moderate edema     Gait: near equal stance time and step length      Knee AROM (R/L in degrees): 4-0-140; 3-0-140     MMT (R/L):   Iliopsoas- 4+/5; 4+/5  Hip Abd- 4/5; 4/5  Glute max- 4+/5; 4+/5  Quadriceps- 5/5; 5/5  Hamstrings- 4+/5; 4+/5     Length tests (R/L in degrees):  90/90 hamstrings- (23) / (30)     Outcome Measures:  LEFS: 59/80    Treatments:  Therapeutic Exercise:  SRB x 5 min  SLR 2# x 20  SAQ 2# 2x10 w/ 5 sec hold R  LSLR 2# 2x10 R  LAQ 2# x 20  Leg press 60# 2x10  HSC 22# 2x10  KE 11# 2x10  TKE, black band, 2x10 w/ 5 sec hold R  Green TB lat amb " at rail x 3, monster walks x 3   Squats to bench/BOSU, green TB abd, 2x10    Goals:  Active       PT Problem       PT Goal 1       Start:  05/02/25    Expected End:  07/01/25       Increase R quadriceps and hamstrings MMT to 5/5 and B iliopsoas, hip abd and glute max MMT to at least 4+/5.         PT Goal 2       Start:  05/02/25    Expected End:  07/01/25       Increase length in R hamstrings by 10 degrees.         PT Goal 3       Start:  05/02/25    Expected End:  07/01/25       Pt will amb with improved R LE stance time.         PT Goal 4       Start:  05/02/25    Expected End:  07/01/25       Pt will report 50% improvement in R knee pain to improve sitting, sleeping and general ADLs.         PT Goal 5       Start:  05/02/25    Expected End:  07/01/25       Improve LEFS by 5 points or more.         PT Goal 6       Start:  05/02/25    Expected End:  07/01/25       Pt will demonstrate independence with HEP.

## 2025-07-23 ENCOUNTER — HOSPITAL ENCOUNTER (OUTPATIENT)
Dept: RADIOLOGY | Facility: HOSPITAL | Age: 75
Discharge: HOME | End: 2025-07-23
Payer: MEDICARE

## 2025-07-23 ENCOUNTER — EDUCATION (OUTPATIENT)
Dept: ORTHOPEDIC SURGERY | Facility: HOSPITAL | Age: 75
End: 2025-07-23
Payer: MEDICARE

## 2025-07-23 ENCOUNTER — APPOINTMENT (OUTPATIENT)
Facility: CLINIC | Age: 75
End: 2025-07-23
Payer: MEDICARE

## 2025-07-23 ENCOUNTER — PRE-ADMISSION TESTING (OUTPATIENT)
Dept: PREADMISSION TESTING | Facility: HOSPITAL | Age: 75
End: 2025-07-23
Payer: MEDICARE

## 2025-07-23 ENCOUNTER — LAB (OUTPATIENT)
Dept: LAB | Facility: HOSPITAL | Age: 75
End: 2025-07-23
Payer: MEDICARE

## 2025-07-23 VITALS
HEIGHT: 60 IN | BODY MASS INDEX: 24.35 KG/M2 | OXYGEN SATURATION: 98 % | TEMPERATURE: 97 F | HEART RATE: 63 BPM | WEIGHT: 124 LBS | RESPIRATION RATE: 16 BRPM | DIASTOLIC BLOOD PRESSURE: 80 MMHG | SYSTOLIC BLOOD PRESSURE: 148 MMHG

## 2025-07-23 DIAGNOSIS — M25.561 CHRONIC PAIN OF RIGHT KNEE: ICD-10-CM

## 2025-07-23 DIAGNOSIS — Z01.818 ENCOUNTER FOR OTHER PREPROCEDURAL EXAMINATION: Primary | ICD-10-CM

## 2025-07-23 DIAGNOSIS — Z01.818 PREOP TESTING: Primary | ICD-10-CM

## 2025-07-23 DIAGNOSIS — G89.29 CHRONIC PAIN OF RIGHT KNEE: ICD-10-CM

## 2025-07-23 DIAGNOSIS — R79.9 ABNORMAL FINDING OF BLOOD CHEMISTRY, UNSPECIFIED: ICD-10-CM

## 2025-07-23 LAB
ALBUMIN SERPL BCP-MCNC: 4.5 G/DL (ref 3.4–5)
ALP SERPL-CCNC: 68 U/L (ref 33–136)
ALT SERPL W P-5'-P-CCNC: 12 U/L (ref 7–45)
ANION GAP SERPL CALC-SCNC: 13 MMOL/L (ref 10–20)
AST SERPL W P-5'-P-CCNC: 16 U/L (ref 9–39)
ATRIAL RATE: 60 BPM
BASOPHILS # BLD AUTO: 0.03 X10*3/UL (ref 0–0.1)
BASOPHILS NFR BLD AUTO: 0.5 %
BILIRUB SERPL-MCNC: 1.4 MG/DL (ref 0–1.2)
BUN SERPL-MCNC: 10 MG/DL (ref 6–23)
CALCIUM SERPL-MCNC: 9.5 MG/DL (ref 8.6–10.3)
CHLORIDE SERPL-SCNC: 102 MMOL/L (ref 98–107)
CO2 SERPL-SCNC: 29 MMOL/L (ref 21–32)
CREAT SERPL-MCNC: 0.87 MG/DL (ref 0.5–1.05)
EGFRCR SERPLBLD CKD-EPI 2021: 70 ML/MIN/1.73M*2
EOSINOPHIL # BLD AUTO: 0.04 X10*3/UL (ref 0–0.4)
EOSINOPHIL NFR BLD AUTO: 0.7 %
ERYTHROCYTE [DISTWIDTH] IN BLOOD BY AUTOMATED COUNT: 11.8 % (ref 11.5–14.5)
EST. AVERAGE GLUCOSE BLD GHB EST-MCNC: 103 MG/DL
GLUCOSE SERPL-MCNC: 88 MG/DL (ref 74–99)
HBA1C MFR BLD: 5.2 % (ref ?–5.7)
HCT VFR BLD AUTO: 42.9 % (ref 36–46)
HGB BLD-MCNC: 14.1 G/DL (ref 12–16)
IMM GRANULOCYTES # BLD AUTO: 0.01 X10*3/UL (ref 0–0.5)
IMM GRANULOCYTES NFR BLD AUTO: 0.2 % (ref 0–0.9)
LYMPHOCYTES # BLD AUTO: 2.34 X10*3/UL (ref 0.8–3)
LYMPHOCYTES NFR BLD AUTO: 42 %
MCH RBC QN AUTO: 30.9 PG (ref 26–34)
MCHC RBC AUTO-ENTMCNC: 32.9 G/DL (ref 32–36)
MCV RBC AUTO: 94 FL (ref 80–100)
MONOCYTES # BLD AUTO: 0.4 X10*3/UL (ref 0.05–0.8)
MONOCYTES NFR BLD AUTO: 7.2 %
NEUTROPHILS # BLD AUTO: 2.75 X10*3/UL (ref 1.6–5.5)
NEUTROPHILS NFR BLD AUTO: 49.4 %
NRBC BLD-RTO: NORMAL /100{WBCS}
P AXIS: 20 DEGREES
P OFFSET: 177 MS
P ONSET: 128 MS
PLATELET # BLD AUTO: 206 X10*3/UL (ref 150–450)
POTASSIUM SERPL-SCNC: 3.9 MMOL/L (ref 3.5–5.3)
PR INTERVAL: 168 MS
PROT SERPL-MCNC: 7.1 G/DL (ref 6.4–8.2)
Q ONSET: 212 MS
QRS COUNT: 9 BEATS
QRS DURATION: 76 MS
QT INTERVAL: 420 MS
QTC CALCULATION(BAZETT): 420 MS
QTC FREDERICIA: 420 MS
R AXIS: -30 DEGREES
RBC # BLD AUTO: 4.57 X10*6/UL (ref 4–5.2)
RBC MORPH BLD: NORMAL
SODIUM SERPL-SCNC: 140 MMOL/L (ref 136–145)
T AXIS: 18 DEGREES
T OFFSET: 422 MS
VENTRICULAR RATE: 60 BPM
WBC # BLD AUTO: 5.6 X10*3/UL (ref 4.4–11.3)

## 2025-07-23 PROCEDURE — 87081 CULTURE SCREEN ONLY: CPT | Mod: BEALAB

## 2025-07-23 PROCEDURE — 73564 X-RAY EXAM KNEE 4 OR MORE: CPT | Mod: RT

## 2025-07-23 PROCEDURE — 83036 HEMOGLOBIN GLYCOSYLATED A1C: CPT

## 2025-07-23 PROCEDURE — 85025 COMPLETE CBC W/AUTO DIFF WBC: CPT

## 2025-07-23 PROCEDURE — 93010 ELECTROCARDIOGRAM REPORT: CPT | Performed by: INTERNAL MEDICINE

## 2025-07-23 PROCEDURE — 99204 OFFICE O/P NEW MOD 45 MIN: CPT | Performed by: NURSE PRACTITIONER

## 2025-07-23 PROCEDURE — 77073 BONE LENGTH STUDIES: CPT

## 2025-07-23 PROCEDURE — 93005 ELECTROCARDIOGRAM TRACING: CPT

## 2025-07-23 PROCEDURE — 73564 X-RAY EXAM KNEE 4 OR MORE: CPT | Mod: RIGHT SIDE | Performed by: RADIOLOGY

## 2025-07-23 PROCEDURE — 73700 CT LOWER EXTREMITY W/O DYE: CPT | Mod: RT

## 2025-07-23 PROCEDURE — 36415 COLL VENOUS BLD VENIPUNCTURE: CPT

## 2025-07-23 PROCEDURE — 77073 BONE LENGTH STUDIES: CPT | Performed by: RADIOLOGY

## 2025-07-23 PROCEDURE — 80053 COMPREHEN METABOLIC PANEL: CPT

## 2025-07-23 RX ORDER — NAPROXEN 250 MG/1
250 TABLET ORAL
COMMUNITY

## 2025-07-23 RX ORDER — LORATADINE 10 MG/1
10 TABLET ORAL DAILY
COMMUNITY

## 2025-07-23 RX ORDER — CHLORHEXIDINE GLUCONATE 40 MG/ML
SOLUTION TOPICAL DAILY
Qty: 470 ML | Refills: 0 | Status: SHIPPED | OUTPATIENT
Start: 2025-07-23 | End: 2025-07-28

## 2025-07-23 RX ORDER — ACETAMINOPHEN 500 MG
500 TABLET ORAL EVERY 6 HOURS PRN
COMMUNITY

## 2025-07-23 RX ORDER — CALCIUM CARBONATE 500(1250)
1 TABLET ORAL
COMMUNITY

## 2025-07-23 RX ORDER — CHOLECALCIFEROL (VITAMIN D3) 25 MCG
25 TABLET ORAL DAILY
COMMUNITY

## 2025-07-23 RX ORDER — DICLOFENAC SODIUM 10 MG/G
4 GEL TOPICAL 4 TIMES DAILY PRN
COMMUNITY

## 2025-07-23 RX ORDER — CHLORHEXIDINE GLUCONATE ORAL RINSE 1.2 MG/ML
15 SOLUTION DENTAL DAILY
Qty: 30 ML | Refills: 0 | Status: SHIPPED | OUTPATIENT
Start: 2025-07-23 | End: 2025-07-25

## 2025-07-23 RX ORDER — OMEPRAZOLE 20 MG/1
20 TABLET, DELAYED RELEASE ORAL
COMMUNITY

## 2025-07-23 ASSESSMENT — PAIN SCALES - GENERAL: PAINLEVEL_OUTOF10: 0 - NO PAIN

## 2025-07-23 ASSESSMENT — PAIN - FUNCTIONAL ASSESSMENT: PAIN_FUNCTIONAL_ASSESSMENT: 0-10

## 2025-07-23 NOTE — GROUP NOTE
In addition to the group class activities, Bharati Bender had the following lab work completed:  No orders of the defined types were placed in this encounter.      A new History and Physical was not completed.    This class lasted approximately 1 hour and had 6 participants. The nurse instructor covered the following topics:  MyChart Enrollment  Communication with Care Team  My Chart is the best form of communication to reach all of your caregivers  You can send messages to specific care givers, or a care team  Continued Education  You will be enrolled in a Joint Replacement care plan to receive additional education before and after surgery  You can review a short recording of the class content - [email or web address]  Access to Medical Records  You can access test results, office notes, appointments, etc.  You can connect to other healthcare systems who use [employer] ([facility], [facility], [facility] [place], etc.)  [employer]  Program Information  Opportunity to Opt Out    Background/Understanding of Joint Replacement Surgery  Potential for same day discharge  Any questions or concerns to be directed to the surgeon's office  Not all patients are appropriate for same day discharge  All patients will be required to meet discharge criteria prior to leaving our care    How to Prepare for Surgery  Use of Recreational Products (Nicotine, Alcohol, THC, CBD, Drugs, Etc.)  The ultimate goal is to quit using thee products!  Stop several weeks before surgery  Such products slow down the healing process and increase risk of post-op infection and complications  Clearance for Surgery  Preadmission Testing - Appropriateness for anesthesia  Medical Clearance by Specialists  Dental Clearance  Cracked/Broken/Loose teeth left untreated may postpone surgery  The importance of post-op antibiotics for dental visits per surgeon protocol  Preadmission Testing  **Potential for postponed surgery if appropriate clearance is not  obtained  Medication Instruction  Follow instructions provided by the doctor who prescribes your medication (typically, but not limited to cardiologist)  Preadmission testing will provide additional instructions during your appointment on what to stop and what to take as you get closer to surgery  For clarification of these instructions, please call preadmission testing directly - [phone]  Tips for Preparing the home for discharge from the hospital  Care Partner  Requirement for surgery, the patient must have a plan to have help at home  Potential for postponed surgery if plan for home support cannot be established  Your Care Partner does not need medical training  How the care partner can help after surgery  CHG Body Wash/Mouth Wash  Follow the instructions given at preadmission testing  Body wash is to be used on the body and hair for 5 washes  Mouthwash is to be used the night before and morning of surgery  **This is a system-wide protocol developed by infectious disease professionals, we will not alter our recommendations for those with sensitive skin or those who have special hair needs.  Please follow the instructions as they are written as this will provide the best infection prevention measures for surgery.  Should you have an allergy to one of the products, please discuss with your preadmission team**    What to Expect in the Hospital/At Home  Morning of Surgery NPO Guidelines  Nothing to eat after midnight  Water can be consumed up to 2 hours prior to arrival  Surgical and Post-Surgical Care Team  Surgical Team  Anesthesia Team  Nursing  Physical Therapy  Care Coordinating  Pharmacy  Hospital Arrival Instructions  Arrive at the time provided to you  Consider traffic patterns (rush-hour) based on arrival time  Have arrangements made for a ride home  If discharging same day, care partner should remain at the hospital  Recovering after Surgery  Recovery Room - Visitors are not brought back  Transition to  hospital room - 2nd Floor, Visitors will be directed to your room  The presence of and strategies for controlling surgical pain and swelling  The importance of early mobility  Side effects after surgery  What to expect if staying overnight    Discharge Planning  The intended plan for discharge will be for patients to discharge home  All patients require a care partner (family, friend, neighbor, etc.) to stay with the patient for the first few nights after surgery  The inability to secure help at home may postpone surgery  Home Care Services set up per surgeon order  Physical Therapy  Occupational Therapy  **If desired, private duty care can be arranged by the patient ahead of time**  Outpatient Physical Therapy per surgeon order    Recovering at Home  Wound Care  Follow wound care instructions found in your discharge paperwork  Bandage is water-resistant and you may shower with the bandage  Do not scrub directly over the bandage  Do not submerge in water until cleared (bathtub, hot tub, pool, etc.)    Post-Op Risk Prevention  Infection Prevention  Promptly seek treatment for any infections post-operatively  Routine dental visits must be postponed for 3 months after surgery  Your surgeon may require antibiotics prior to future dental visits  Any concerns for infection not related directly to the knee or the hip should be managed by your primary care provider  Blood Clots  Be sure to complete the course of blood thinning medication as prescribed by your surgeon  Movement every 1-2 hours during the day is encouraged to prevent blood clots  Monitor for signs of blood clots  Wear compression stockings as prescribed by your surgeon  Constipation  Constipation is common following surgery  Drink plenty of fluids  Take stool softener/laxative as prescribed by your surgeon  Move around frequently  Eat foods high in fiber  Fall Prevention  Prepare home ahead of time to clear space to move with walker  Remove throw rugs and  electrical cords from walkways  Install railings near any stairways with more than 2 steps  Use night lights for increased visibility at night  Continue to use your assistive device until cleared by surgeon or physical therapy  Dislocation Prevention - Not all procedures will have dislocation precautions  Follow dislocation precautions provided by your surgeon  It is OK to resume sexual activity about 6 weeks following surgery  Be sure to follow any dislocation precautions assigned    Durable Medical Equipment  Cold Therapy  Breg Cold Therapy Machines  Ice/Gel Packs  Assistive Devices  Folding Walker with Wheels (in the front only)  No Rollators  Crutches if approved by Physical Therapy and Surgeon after surgery  Hip Kits  Raised Toilet Seats  Additional Compression Stockings    Joint Preservation  Healthy Activities when Cleared  Walking  Swimming  Bike Riding  Activities to Avoid  Refrain from repetitive motions which have a high impact on the joint  Gradual Progression  Progress activity slowly, listen to your body  Common Findings - NORMAL after surgery  Clicking/Grinding  Numbness near incision    Physical Therapy  Prehabilitation exercises  START TODAY ON BOTH LEGS  Surgery Specific Precautions  Follow surgery specific precautions found in your discharge paperwork    Follow-Up Visit  All patients will see their surgeon for a follow up visit after surgery  The visit may range from 2-6 weeks after surgery and is surgeon specific      Please don't hesitate to reach out if you have any additional questions or concerns.    Thank you,  BERNARDO Putnam, RN  Vi Tran RN  [facility] Nav[facility]  [facility]   [phone]

## 2025-07-23 NOTE — PREPROCEDURE INSTRUCTIONS
Medication List            Accurate as of July 23, 2025 12:31 PM. Always use your most recent med list.                acetaminophen 500 mg tablet  Commonly known as: Tylenol  Medication Adjustments for Surgery: Take/Use as prescribed     B complex-vitamin C-folic acid 1- mg-mg-mcg tablet  Commonly known as: Nephro-Clarissa Rx  Additional Medication Adjustments for Surgery: Take last dose 7 days before surgery  Notes to patient: last dose preoperatively on 8/5/25     calcium carbonate 1,250 mg (500 mg elemental) tablet  Commonly known as: Os-Orion  Additional Medication Adjustments for Surgery: Take last dose 7 days before surgery  Notes to patient: last dose preoperatively on 8/5/25     * chlorhexidine 4 % external liquid  Commonly known as: Hibiclens  Apply topically once daily for 5 days.  Medication Adjustments for Surgery: Take/Use as prescribed     * chlorhexidine 0.12 % solution  Commonly known as: Peridex  Use 15 mL in the mouth or throat once daily for 2 doses. Mouthwash, use 15 ml the night before surgery and the morning of surgery. Swish and spit, do not swallow  Medication Adjustments for Surgery: Take/Use as prescribed     diclofenac sodium 1 % gel  Commonly known as: Voltaren  Additional Medication Adjustments for Surgery: Take last dose 7 days before surgery  Notes to patient: last dose preoperatively on 8/5/25     lactobacillus 10 billion cell capsule  Commonly known as: Culturelle  Medication Adjustments for Surgery: Do Not take on the morning of surgery     loratadine 10 mg tablet  Commonly known as: Claritin  Medication Adjustments for Surgery: Do Not take on the morning of surgery     lovastatin 40 mg 24 hr tablet  Commonly known as: Altoprev  Medication Adjustments for Surgery: Take/Use as prescribed     naproxen 250 mg tablet  Commonly known as: Naprosyn  Additional Medication Adjustments for Surgery: Take last dose 7 days before surgery  Notes to patient: last dose preoperatively on  8/5/25     omeprazole OTC 20 mg EC tablet  Commonly known as: PriLOSEC OTC  Medication Adjustments for Surgery: Take/Use as prescribed     Vitamin D3 25 mcg (1,000 units) tablet  Generic drug: cholecalciferol  Additional Medication Adjustments for Surgery: Take last dose 7 days before surgery  Notes to patient: last dose preoperatively on 8/5/25           * This list has 2 medication(s) that are the same as other medications prescribed for you. Read the directions carefully, and ask your doctor or other care provider to review them with you.                  NPO Instructions:     Do not eat any food after midnight the night before your surgery/procedure.  You may have clear liquids until TWO hours before surgery/procedure. This includes water, black tea/coffee, (no milk or cream) apple juice and electrolyte drinks (Gatorade).  You may chew gum up to TWO hours before your surgery/procedure.     Additional Instructions:      Seven/Six Days before Surgery:  Review your medication instructions, stop indicated medications  Five Days before Surgery:  Review your medication instructions, stop indicated medications  Begin using your Hibiclens  Three Days before Surgery:  Review your medication instructions, stop indicated medications  The Day before Surgery:  Start using 0.12% CHG mouthwash  No smoking or alcohol use 24 hours before surgery  Review your medication instructions, stop indicated medications  You will be contacted regarding the time of your arrival to facility and surgery time  Do not eat any food after Midnight  Day of Surgery:  Review your medication instructions, take indicated medications  If you have diabetes, please check your fasting blood sugar upon awakening.  If fasting blood sugar is <80 mg/dl, drink 100 ml of apple juice, time limit of 2 hours before  You may have clear liquids until TWO hours before surgery/procedure.  This includes water, black tea/coffee, (no milk or cream) apple juice and  electrolyte drinks (Gatorade)  You may chew gum up to TWO hours before your surgery/procedure  Wear  comfortable loose fitting clothing  Do not use moisturizers, creams, lotions or perfume  All jewelry and valuables should be left at home     CONTACT SURGEON'S OFFICE IF YOU DEVELOP:  * Fever = 100.4 F   * New respiratory symptoms (e.g. cough, shortness of breath, respiratory distress, sore throat)  * Recent loss of taste or smell  *Flu like symptoms such as headache, fatigue or gastrointestinal symptoms  * You develop any open sores, shingles, burning or painful urination   AND/OR:  * You no longer wish to have the surgery.  * Any other personal circumstances change that may lead to the need to cancel or defer this surgery.  *You were admitted to any hospital within one week of your planned procedure.     SMOKING:  *Quitting smoking can make a huge difference to your health and recovery from surgery.    *If you need help with quitting, call 0-329-QUIT-NOW.     THE DAY BEFORE SURGERY:  *Do not eat any food after midnight the night before your surgery.   *You may have up to TEN OUNCES of clear liquids until TWO hours before surgery/procedure.. This includes water, black tea/coffee, (no milk or cream) apple juice, and electrolyte drinks (Gatorade). Please avoid clear liquids that are red in color.   *You may chew gum/mints up to TWO hours before your surgery/procedure.     SURGICAL TIME:  *You will be contacted between 2 p.m. and 3 p.m. the business day before your surgery with your arrival time.  *If you haven't received a call by 3pm, call [phone]  *Scheduled surgery times may change and you will be notified if this occurs-check your personal voicemail for any updates.     ON THE MORNING OF SURGERY:  *Wear comfortable, loose fitting clothing.   *Do not use moisturizers, creams, lotions or perfume.  *All jewelry and valuables should be left at home.  *Prosthetic devices such as contact lenses, hearing aids,  dentures, eyelash extensions, hairpins and body piercing must be removed before surgery.     BRING WITH YOU:  *Photo ID and insurance card  *Current list of medications and allergies  *Pacemaker/Defibrillator/Heart stent cards  *CPAP machine and mask  *Slings/splints/crutches  *Copy of your complete Advanced Directive/DHPOA-if applicable  *Neurostimulator implant remote     PARKING AND ARRIVAL:  *Check in at the Main Entrance desk and let them know you are here for surgery.     IF YOU ARE HAVING OUTPATIENT/SAME DAY SURGERY:  *A responsible adult MUST accompany you at the time of discharge and stay with you for 24 hours after your surgery.  *You may NOT drive yourself home after surgery.  *You may use a taxi or ride sharing service ([employer], Uber) to return home ONLY if you are accompanied by a friend or family member.  *Instructions for resuming your medications will be provided by your surgeon.     Thank you for coming to Pre Admission testing.      If I have prescribed medication please don't forget to  at your pharmacy.      Any questions about today's visit call [phone] and leave a message in the general mailbox.     Patient instructed to ambulate as soon as possible postoperatively to decrease thromboembolic risk.     Thank you for visiting the [facility].  If you have any changes to your health condition, please call the surgeons office to alert them and give them details of your symptoms.        Preoperative Fasting Guidelines     Why must I stop eating and drinking near surgery time?  With sedation, food or liquid in your stomach can enter your lungs causing serious complications  Increases nausea and vomiting     When do I need to stop eating and drinking before my surgery?  Do not eat any food after midnight the night before your surgery/procedure.  You may have up to TEN ounces of clear liquid until TWO hours before your surgery/procedure.  This includes water, black  tea/coffee, (no milk or cream) apple juice, and electrolyte drinks (Gatorade)  You may chew gum until TWO hours before your surgery/procedure        Additional Instructions:      The Day before Surgery:  -Review your medication instructions, stop indicated medications  -You will be contacted in the evening regarding the time of your arrival to facility and surgery time     Day of Surgery:  -Review your medication instructions, take indicated medications  -Wear comfortable loose fitting clothing  -Do not use moisturizers, creams, lotions or perfume  -All jewelry and valuables should be left at home                   Preoperative Brain Exercises     What are brain exercises?  A brain exercise is any activity that engages your thinking (cognitive) skills.     What types of activities are considered brain exercises?  Jigsaw puzzles, crossword puzzles, word jumble, memory games, word search, and many more.  Many can be found free online or on your phone via a mobile shanda.     Why should I do brain exercises before my surgery?  More recent research has shown brain exercise before surgery can lower the risk of postoperative delirium (confusion) which can be especially important for older adults.  Patients who did brain exercises for 5 to 10 minutes/day in the days before surgery, cut their risk of postoperative delirium in half up to 1 week after surgery.                         [facility]     Preoperative Deep Breathing Exercises     Why it is important to do deep breathing exercises before my surgery?  Deep breathing exercises strengthen your breathing muscles.  This helps you to recover after your surgery and decreases the chance of breathing complications.        How are the deep breathing exercises done?  Sit straight with your back supported.  Breathe in deeply and slowly through your nose. Your lower rib cage should expand and your abdomen may move forward.  Hold that breath for 3 to 5  seconds.  Breathe out through pursed lips, slowly and completely.  Rest and repeat 10 times every hour while awake.  Rest longer if you become dizzy or lightheaded.                      [facility]     Preoperative Deep Breathing Exercises     Why it is important to do deep breathing exercises before my surgery?  Deep breathing exercises strengthen your breathing muscles.  This helps you to recover after your surgery and decreases the chance of breathing complications.        How are the deep breathing exercises done?  Sit straight with your back supported.  Breathe in deeply and slowly through your nose. Your lower rib cage should expand and your abdomen may move forward.  Hold that breath for 3 to 5 seconds.  Breathe out through pursed lips, slowly and completely.  Rest and repeat 10 times every hour while awake.  Rest longer if you become dizzy or lightheaded.        Patient Information: Incentive Spirometer  What is an incentive spirometer?  An incentive spirometer is a device used before and after surgery to “exercise” your lungs.  It helps you to take deeper breaths to expand your lungs.  Below is an example of a basic incentive spirometer.  The device you receive may differ slightly but they all function the same.    Why do I need to use an incentive spirometer?  Using your incentive spirometer prepares your lungs for surgery and helps prevent lung problems after surgery.  How do I use my incentive spirometer?  When you're using your incentive spirometer, make sure to breathe through your mouth. If you breathe through your nose, the incentive spirometer won't work properly. You can hold your nose if you have trouble.  If you feel dizzy at any time, stop and rest. Try again at a later time.  Follow the steps below:  Set up your incentive spirometer, expand the flexible tubing and connect to the outlet.  Sit upright in a chair or bed. Hold the incentive spirometer at eye level.   Put  the mouthpiece in your mouth and close your lips tightly around it. Slowly breathe out (exhale) completely.  Breathe in (inhale) slowly through your mouth as deeply as you can. As you take a breath, you will see the piston rise inside the large column. While the piston rises, the indicator should move upwards. It should stay in between the 2 arrows (see Figure).  Try to get the piston as high as you can, while keeping the indicator between the arrows.   If the indicator doesn't stay between the arrows, you're breathing either too fast or too slow.  When you get it as high as you can, hold your breath for 10 seconds, or as long as possible. While you're holding your breath, the piston will slowly fall to the base of the spirometer.  Once the piston reaches the bottom of the spirometer, breathe out slowly through your mouth. Rest for a few seconds.  Repeat 10 times. Try to get the piston to the same level with each breath.  Repeat every hour while awake  You can carefully clean the outside of the mouthpiece with an alcohol wipe or soap and water.       Patient and Family Education             Ways You Can Help Prevent Blood Clots                    This handout explains some simple things you can do to help prevent blood clots.      Blood clots are blockages that can form in the body's veins. When a blood clot forms in your deep veins, it may be called a deep vein thrombosis, or DVT for short. Blood clots can happen in any part of the body where blood flows, but they are most common in the arms and legs. If a piece of a blood clot breaks free and travels to the lungs, it is called a pulmonary embolus (PE). A PE can be a very serious problem.         Being in the hospital or having surgery can raise your chances of getting a blood clot because you may not be well enough to move around as much as you normally do.         Ways you can help prevent blood clots in the hospital           Wearing SCDs. SCDs stands for  Sequential Compression Devices.   SCDs are special sleeves that wrap around your legs  They attach to a pump that fills them with air to gently squeeze your legs every few minutes.   This helps return the blood in your legs to your heart.   SCDs should only be taken off when walking or bathing.   SCDs may not be comfortable, but they can help save your life.                                            Wearing compression stockings - if your doctor orders them. These special snug fitting stockings gently squeeze your legs to help blood flow.       Walking. Walking helps move the blood in your legs.   If your doctor says it is ok, try walking the halls at least   5 times a day. Ask us to help you get up, so you don't fall.      Taking any blood thinning medicines your doctor orders.        Page 1 of 2            [facility]; 3/23   Ways you can help prevent blood clots at home         Wearing compression stockings - if your doctor orders them. ? Walking - to help move the blood in your legs.       Taking any blood thinning medicines your doctor orders.      Signs of a blood clot or PE        Tell your doctor or nurse know right away if you have of the problems listed below.    If you are at home, seek medical care right away. Call 911 for chest pain or problems breathing.                Signs of a blood clot (DVT) - such as pain,  swelling, redness or warmth in your arm or leg      Signs of a pulmonary embolism (PE) - such as chest pain or feeling short of breath    Patient Information: Pre-Operative Infection Prevention Measures     Why did I have my nose, under my arms, and groin swabbed?  The purpose of the swab is to identify Staphylococcus aureus inside your nose or on your skin.  The swab was sent to the laboratory for culture.  A positive swab/culture for Staphylococcus aureus is called colonization or carriage.      What is Staphylococcus aureus?  Staphylococcus aureus, also known as “staph”, is a  germ found on the skin or in the nose of healthy people.  Sometimes Staphylococcus aureus can get into the body and cause an infection.  This can be minor (such as pimples, boils, or other skin problems).  It might also be serious (such as a blood infection, pneumonia, or a surgical site infection).    What is Staphylococcus aureus colonization or carriage?  Colonization or carriage means that a person has the germ but is not sick from it.  These bacteria can be spread on the hands or when breathing or sneezing.    How is Staphylococcus aureus spread?  It is most often spread by close contact with a person or item that carries it.    What happens if my culture is positive for Staphylococcus aureus?  Your doctor/medical team will use this information to guide any antibiotic treatment which may be necessary.  Regardless of the culture results, we will clean the inside of your nose with a betadine swab just before you have your surgery.      Will I get an infection if I have Staphylococcus aureus in my nose or on my skin?  Anyone can get an infection with Staphylococcus aureus.  However, the best way to reduce your risk of infection is to follow the instructions provided to you for the use of your CHG soap and dental rinse.        Patient Information: Oral/Dental Rinse    What is oral/dental rinse?   It is a mouthwash. It is a way of cleaning the mouth with a germ-killing solution before your surgery.  The solution contains chlorhexidine, commonly known as CHG.   It is used inside the mouth to kill a bacteria known as Staphylococcus aureus.  Let your doctor know if you are allergic to Chlorhexidine.    We have sent a prescription for CHG 0.12% mouthwash to your preferred pharmacy.  If you have not already, Please  your prescription and start using the day before before surgery.  Follow the instruction sheet provided to you at your CPM/PAT appointment. Please contact [facility] if you do not receive your CHG  mouthwash prescription.     Why do I need to use CHG oral/dental rinse?  The CHG oral/dental rinse helps to kill a bacteria in your mouth known as Staphylococcus aureus.     This reduces the risk of infection at the surgical site.      Using your CHG oral/dental rinse  STEPS:  Use your CHG oral/dental rinse after you brush your teeth the night before (at bedtime) and the morning of your surgery.  Follow all directions on your prescription label.    Use the cap on the container to measure 15ml   Swish (gargle if you can) the mouthwash in your mouth for at least 30 seconds, (do not swallow) and spit out  After you use your CHG rinse, do not rinse your mouth with water, drink or eat.  Please refer to the prescription label for the appropriate time to resume oral intake      What side effects might I have using the CHG oral/dental rinse?  CHG rinse will stick to plaque on the teeth.  Brush and floss just before use.  Teeth brushing will help avoid staining of plaque during use.      Patient Information: Home Preoperative Antibacterial Shower      What is a home preoperative antibacterial shower?  This shower is a way of cleaning the skin with a germ-killing solution before surgery.  The solution contains chlorhexidine, commonly known as CHG.  CHG is a skin cleanser with germ-killing ability.  Let your doctor know if you are allergic to chlorhexidine.    Why do I need to take a preoperative antibacterial shower?  Skin is not sterile.  It is best to try to make your skin as free of germs as possible before surgery.  Proper cleansing with a germ-killing soap before surgery can lower the number of germs on your skin.  This helps to reduce the risk of infection at the surgical site.  Following the instructions listed below will help you prepare your skin for surgery.      How do I use the solution?  Steps:  Begin using your CHG soap 5 days before your scheduled surgery on 8/9/25.    First, wash and rinse your hair using the  CHG soap. Keep CHG soap away from ear canals and eyes.  Rinse completely, do not condition.  Hair extensions should be removed.  Wash your face with your normal soap and rinse.    Apply the CHG solution to a clean wet washcloth.  Turn the water off or move away from the water spray to avoid premature rinsing of the CHG soap as you are applying.   Firmly lather your entire body from the neck down.  Do not use on your face.  Pay special attention to the area(s) where your incision(s) will be located unless they are on your face.  Avoid scrubbing your skin too hard.  The important point is to have the CHG soap sit on your skin for 3 minutes.    When the 3 minutes are up, turn on the water and rinse the CHG solution off your body completely.   DO NOT wash with regular soap after you have used the CHG soap solution  Pat yourself dry with a clean, freshly-laundered towel.  DO NOT apply powders, deodorants, or lotions.  Dress in clean, freshly laundered nightclothes.    Be sure to sleep with clean, freshly laundered sheets.  Be aware that CHG will cause stains on fabrics; if you wash them with bleach after use.  Rinse your washcloth and other linens that have contact with CHG completely.  Use only non-chlorine detergents to launder the items used.   The morning of surgery is the fifth day.  Repeat the above steps and dress in clean comfortable clothing     Whom should I contact if I have any questions regarding the use of CHG soap?  Call the [facility] at [phone] if you have any questions.      Preoperative Clearances  -If you are informed by the preadmission testing team that you need clearance for your surgery, please reach out to the provider in question to assisting accommodating obtaining your clearance.   -Please have you provider fax your clearance letter to [phone] if needed.   -A blank clearance letter will be provided to you if  indicated.     Anticoagulation  -If you are on oral anticoagulation such as Coumadin, Eliquis, Xarelto, Plavix, Brilinta, Pradaxa; we will need to obtain preoperative anticoagulation instructions from the prescribing provider.   -We will reach out to the prescriber but do encourage you to call their office as well as it will increase the chances of getting the necessary information.   -We will contact you with the instruction once we obtain them.

## 2025-07-23 NOTE — CPM/PAT H&P
[facility]/PAT Evaluation       Name: Bharati Bender (Bharati Bender)  /Age: 1950/75 y.o.     In-Person       Chief Complaint: Arthritis of right knee     HPI  Patient is an alert and oriented 75 year old female scheduled for a ARTHROPLASTY, KNEE, TOTAL, USING ROBOT-ASSISTED NAVIGATION on 25 with Dr. Sarabia for  Arthritis of right knee . PMHX includes HTN, GERD, DAVE. Presents to [facility] today for perioperative risk stratification and optimization.     Medical History[1]    Surgical History[2]    Patient  has no history on file for sexual activity.    Family History[3]    Allergies[4]    Prior to Admission medications   Not on File       Review of Systems   Constitutional: Negative for chills, decreased appetite, diaphoresis, fever and malaise/fatigue.   Eyes:  Negative for blurred vision and double vision.   Cardiovascular:  Negative for chest pain, claudication, cyanosis, dyspnea on exertion, irregular heartbeat, leg swelling, near-syncope and palpitations.   Respiratory:  Negative for cough, hemoptysis, shortness of breath and wheezing.    Endocrine: Negative for cold intolerance, heat intolerance, polydipsia, polyphagia and polyuria.   Gastrointestinal:  Negative for abdominal pain, constipation, diarrhea, dysphagia, nausea and vomiting.   Genitourinary:  Negative for bladder incontinence, dysuria, hematuria, incomplete emptying, nocturia, frequency, pelvic pain and urgency.   Neurological:  Negative for headaches, light-headedness, paresthesias, sensory change and weakness.   Psychiatric/Behavioral:  Negative for altered mental status.    Musculoskeletal: Positive for myalgias, arthralgias     Vitals and nursing note reviewed.     Physical exam  Constitutional:       Appearance: Normal appearance. She is Normal.   HENT:      Head: Normocephalic and atraumatic.      Mouth/Throat:      Mouth: Mucous membranes are moist.      Pharynx: Oropharynx is clear.   Eyes:      Extraocular Movements: Extraocular  movements intact.      Conjunctiva/sclera: Conjunctivae normal.      Pupils: Pupils are equal, round, and reactive to light.   Cardiovascular:      PMI at left midclavicular line. Normal rate. Regular rhythm. Normal S1. Normal S2.       Murmurs: There is no murmur.      No gallop.  No click. No rub.       No audible carotid bruit     No lower extremity edema on exam  Pulmonary:      Effort: Pulmonary effort is normal.      Breath sounds: Normal breath sounds.   Abdominal:      General: Abdomen is flat. Bowel sounds are normal.      Palpations: Abdomen is soft and non-tender  Musculoskeletal:      Cervical back: Normal range of motion and neck supple.   Skin:     General: Skin is warm and dry.      Capillary Refill: Capillary refill takes less than 2 seconds.   Neurological:      General: No focal deficit present.      Mental Status: She is alert and oriented to person, place, and time. Mental status is at baseline.   Psychiatric:         Mood and Affect: Mood normal.         Behavior: Behavior normal.         Thought Content: Thought content normal.         Judgment: Judgment normal.     Vitals and nursing note reviewed. Physical exam within normal limits.       DASI Risk Score      Flowsheet Row Questionnaire Series Submission from 4/1/2025 in [facility] with Generic Provider Eddie   Can you take care of yourself (eat, dress, bathe, or use toilet)?  2.75  filed at 04/01/2025 1954   Can you walk indoors, such as around your house? 1.75  filed at 04/01/2025 1954   Can you walk a block or two on level ground?  2.75  filed at 04/01/2025 1954   Can you climb a flight of stairs or walk up a hill? 5.5  filed at 04/01/2025 1954   Can you run a short distance? 0  filed at 04/01/2025 1954   Can you do light work around the house like dusting or washing dishes? 2.7  filed at 04/01/2025 1954   Can you do moderate work around the house like vacuuming, sweeping floors or carrying groceries? 3.5  filed at 04/01/2025 1954    Can you do heavy work around the house like scrubbing floors or lifting and moving heavy furniture?  8  filed at 04/01/2025 1954   Can you do yard work like raking leaves, weeding or pushing a mower? 4.5  filed at 04/01/2025 1954   Can you have sexual relations? 5.25  filed at 04/01/2025 1954   Can you participate in moderate recreational activities like golf, bowling, dancing, doubles tennis or throwing a baseball or football? 6  filed at 04/01/2025 1954   Can you participate in strenous sports like swimming, singles tennis, football, basketball, or skiing? 7.5  filed at 04/01/2025 1954   DASI SCORE 50.2  filed at 04/01/2025 1954   METS Score (Will be calculated only when all the questions are answered) 8.9  filed at 04/01/2025 1954     Caprini DVT Assessment      Flowsheet Row Pre-Admission Testing from 7/23/2025 in [facility]   DVT Score (IF A SCORE IS NOT CALCULATING, MUST SELECT A BMI TO COMPLETE) 8 filed at 07/23/2025 1303   Surgical Factors Elective major lower extremity arthroplasty filed at 07/23/2025 1303   BMI (BMI MUST BE CHOSEN) 30 or less filed at 07/23/2025 1303     Modified Frailty Index    No data to display  [id number] Stroke Risk Points  Current as of just now        N/A 0 to 9 Points:      Last Change: N/A          The [id number] risk score (Lip GH, et al. 2009. © 2010 American [employer]) quantifies the risk of stroke for a patient with atrial fibrillation. For patients without atrial fibrillation or under the age of 18 this score appears as N/A. Higher score values generally indicate higher risk of stroke.        This score is not applicable to this patient. Components are not calculated.          Revised Cardiac Risk Index      Flowsheet Row Pre-Admission Testing from 7/23/2025 in [facility]   High-Risk Surgery (Intraperitoneal, Intrathoracic,Suprainguinal vascular) 0 filed at 07/23/2025 1304   History of ischemic heart disease (History  of MI, History of positive exercuse test, Current chest paint considered due to myocardial ischemia, Use of nitrate therapy, ECG with pathological Q Waves) 0 filed at 07/23/2025 1304   History of congestive heart failure (pulmonary edemia, bilateral rales or S3 gallop, Paroxysmal nocturnal dyspnea, CXR showing pulmonary vascular redistribution) 0 filed at 07/23/2025 1304   History of cerebrovascular disease (Prior TIA or stroke) 0 filed at 07/23/2025 1304   Pre-operative insulin treatment 0 filed at 07/23/2025 1304   Pre-operative creatinine>2 mg/dl 0 filed at 07/23/2025 1304   Revised Cardiac Risk Calculator 0 filed at 07/23/2025 1304     Apfel Simplified Score    No data to display  Risk Analysis Index Results This Encounter    No data found in the last 10 encounters.       Stop Bang Score      Flowsheet Row Pre-Admission Testing from 7/23/2025 in [facility] Questionnaire Series Submission from 4/1/2025 in [facility] with Generic Provider Eddie   Do you snore loudly? 1 filed at 07/23/2025 1223 1  filed at 04/01/2025 1954   Do you often feel tired or fatigued after your sleep? 0 filed at 07/23/2025 1223 0  filed at 04/01/2025 1954   Has anyone ever observed you stop breathing in your sleep? 1 filed at 07/23/2025 1223 0  filed at 04/01/2025 1954   Do you have or are you being treated for high blood pressure? 0 filed at 07/23/2025 1223 0  filed at 04/01/2025 1954   Recent BMI (Calculated) 24.2 filed at 07/23/2025 1223 23.1  filed at 04/01/2025 1954   Is BMI greater than 35 kg/m2? 0=No filed at 07/23/2025 1223 0=No  filed at 04/01/2025 1954   Age older than 50 years old? 1=Yes filed at 07/23/2025 1223 1=Yes  filed at 04/01/2025 1954   Is your neck circumference greater than 17 inches (Male) or 16 inches (Female)? 0 filed at 07/23/2025 1223 --   Gender - Male 0=No filed at 07/23/2025 1223 0=No  filed at 04/01/2025 1954   STOP-BANG Total Score 3 filed at 07/23/2025 1223 --     Prodigy: High Risk   Total Score: 12              Prodigy Age Score           ARISCAT Score for Postoperative Pulmonary Complications    No data to display  Dar Perioperative Risk for Myocardial Infarction or Cardiac Arrest (LUIS ANTONIO)      Flowsheet Row Pre-Admission Testing from 7/23/2025 in [facility]   Calculated Age Score 1.5 filed at 07/23/2025 1304   Functional Status  0 filed at 07/23/2025 1304   ASA Class  -3.29 filed at 07/23/2025 1304   Creatinine 0 filed at 07/23/2025 1304   Type of Procedure  0.80 filed at 07/23/2025 1304   LUIS ANTONIO Total Score  -6.24 filed at 07/23/2025 1304   LUIS ANTONIO % 0.19 filed at 07/23/2025 1304       Assessment & Plan:    Neuro:  No diagnosis or significant findings on chart review or clinical presentation and evaluation.     HEENT/Airway:  DAVE on CPAP pt states she uses her cpap nightly   STOP-BANG Score- points high risk for DAVE    Mallampati::  III    TM distance::  >3 FB    Neck ROM::  Full  Dentures-denies  Crowns-reports x 2  Implants-denies    Cardiovascular:  HTN, HLD (lovastatin)   METS: 8.9  RCRI: 0 points, 3.9%  risk for postoperative MACE   LUIS ANTONIO: 0.19% risk for postoperative MACE  EKG -normal sinus rhythm Rate- No acute changes.     Pulmonary:  No diagnosis or significant findings on chart review or clinical presentation and evaluation.   ARISCAT: <26 points, 1.6% risk of in-hospital postoperative pulmonary complication  PRODIGY: Moderate risk for opioid induced respiratory depression  Smoking History-She has never smoked.  Deep breathing handout given    Renal/Urinary:  No diagnosis or significant findings on chart review or clinical presentation and evaluation, however, the patient is at increased risk of perioperative renal complications secondary to HTN. Preventative measures include BP monitoring, medication compliance, and hydration management.   CMP-Pending  Creatinine-  GFR-    Endocrine:  No diagnosis or significant findings on chart review or clinical presentation and  evaluation.   [id number]    Hematologic/Immunology:  No diagnosis or significant findings on chart review or clinical presentation and evaluation.  The patient is not a Jehovah’s witness and will accept blood and blood products if medically indicated.   History of previous blood transfusions No  CBC-Pending  HGB-  Caprini Score 8, patient at Moderate for postoperative DVT. Pt supplied education/VTE handout  Anticoagulation use: No     Gastrointestinal:   GERD (omeprazole)   Recreational drug use: none  Alcohol use maybe once month     Infectious disease:   No diagnosis or significant findings on chart review or clinical presentation and evaluation.   Prescription provided for CHG body wash and dental rinse. CHG use instructions reviewed and provided to patient.  Staph screen collected-Pending    Musculoskeletal:   OA knee pain   JHFRAT score- 5 points. low risk for falls    Anesthesia:  ASA 2 - Patient with mild systemic disease with no functional limitations  Anticipated anesthesia-spinal  History of General anesthesia- yes  Complications- PONV  No family history of anesthesia complications    Labs & Imaging ordered:  CBC, CMP, HBA1C, MRSA, EKG  Nickel/metal allergy-negative  Shellfish allergy-negative    Discussed with patient medication instructions, NPO guidelines, and any questions or concerns.     time spent 45 minutes  All resulted testing from PAT was forwarded to the surgeon and the patient's PCP if applicable.           [1] No past medical history on file.  [2] No past surgical history on file.  [3] No family history on file.  [4]   Allergies  Allergen Reactions    Bactrim [Sulfamethoxazole-Trimethoprim] Rash    Pollen Extracts Other     Seasonal (sneezing)    Sulfa (Sulfonamide Antibiotics) Rash

## 2025-07-25 LAB — STAPHYLOCOCCUS SPEC CULT: NORMAL

## 2025-07-31 ENCOUNTER — APPOINTMENT (OUTPATIENT)
Dept: ORTHOPEDIC SURGERY | Facility: CLINIC | Age: 75
End: 2025-07-31
Payer: MEDICARE

## 2025-08-07 ENCOUNTER — APPOINTMENT (OUTPATIENT)
Dept: ORTHOPEDIC SURGERY | Facility: CLINIC | Age: 75
End: 2025-08-07
Payer: MEDICARE

## 2025-08-07 DIAGNOSIS — M25.561 CHRONIC PAIN OF RIGHT KNEE: Primary | ICD-10-CM

## 2025-08-07 DIAGNOSIS — G89.29 CHRONIC PAIN OF RIGHT KNEE: Primary | ICD-10-CM

## 2025-08-07 PROCEDURE — 99214 OFFICE O/P EST MOD 30 MIN: CPT | Performed by: STUDENT IN AN ORGANIZED HEALTH CARE EDUCATION/TRAINING PROGRAM

## 2025-08-07 NOTE — PROGRESS NOTES
HOWARD/TKA Related Summary           L hip: N  L knee: N  R hip: N  R knee: N  Lumbar surgery or significant pathology: N            CC/SUBJECTIVE/HPI            Bharati Bender is a 75 y.o. female presenting for preop evaluation for R primary TKA.    Living/Discharge Arrangements  Preferred pharmacy: [facility]  Planned same day DC: N  Assistance level: independent  Cohabitants: spouse  Planned care partner: spouse  Anticipated DC location: home            HISTORIES            Reports no major changes in health, substance use, or baseline medications.            OBJECTIVE            Physical Exam  No changes compared to previously documented orthopedic exam.  Skin at intended incision free of scrapes/bites/lesions/rashes.    Imaging  No new imaging today.            ASSESSMENT & PLAN           Patient verbalized understanding of below A&P. All questions answered.  R Knee DJD  Logistics (day of surgery timing, etc) covered in Preop Joints Class.  Planned incision site discussed  Proceed with surgery  Surgical risks discussion: See consent.  PMH, PSH, other considerations discussed  Osteoporosis  Bactrim allergy (rash)  SDD candidate but would like to stay 1 night  Occupation:   Hobbies: Reading  PCP: Not assigned

## 2025-08-09 PROBLEM — Z96.651 HISTORY OF TOTAL KNEE ARTHROPLASTY, RIGHT: Status: ACTIVE | Noted: 2025-08-09

## 2025-08-09 RX ORDER — TRANEXAMIC ACID 650 MG/1
1950 TABLET ORAL ONCE
Status: CANCELLED | OUTPATIENT
Start: 2025-08-10 | End: 2025-08-10

## 2025-08-09 RX ORDER — ACETAMINOPHEN 500 MG/1
1000 CAPSULE, LIQUID FILLED ORAL EVERY 8 HOURS PRN
Qty: 90 CAPSULE | Refills: 0 | Status: SHIPPED | OUTPATIENT
Start: 2025-08-09 | End: 2025-09-08

## 2025-08-09 RX ORDER — DEXAMETHASONE SODIUM PHOSPHATE 10 MG/ML
10 INJECTION INTRAMUSCULAR; INTRAVENOUS ONCE
Status: CANCELLED | OUTPATIENT
Start: 2025-08-09 | End: 2025-08-09

## 2025-08-09 RX ORDER — PANTOPRAZOLE SODIUM 40 MG/1
40 TABLET, DELAYED RELEASE ORAL
Qty: 42 TABLET | Refills: 0 | Status: SHIPPED | OUTPATIENT
Start: 2025-08-09 | End: 2025-09-20

## 2025-08-09 RX ORDER — DOCUSATE SODIUM 100 MG/1
100 CAPSULE, LIQUID FILLED ORAL 2 TIMES DAILY
Qty: 60 CAPSULE | Refills: 0 | Status: SHIPPED | OUTPATIENT
Start: 2025-08-09 | End: 2025-09-12

## 2025-08-09 RX ORDER — CEFADROXIL 500 MG/1
500 CAPSULE ORAL 2 TIMES DAILY
Qty: 14 CAPSULE | Refills: 0 | Status: SHIPPED | OUTPATIENT
Start: 2025-08-09 | End: 2025-08-20

## 2025-08-09 RX ORDER — PREGABALIN 75 MG/1
75 CAPSULE ORAL 2 TIMES DAILY
Qty: 28 CAPSULE | Refills: 0 | Status: SHIPPED | OUTPATIENT
Start: 2025-08-09 | End: 2025-08-27

## 2025-08-09 RX ORDER — OXYCODONE HYDROCHLORIDE 5 MG/1
5 TABLET ORAL EVERY 6 HOURS PRN
Qty: 28 TABLET | Refills: 0 | Status: SHIPPED | OUTPATIENT
Start: 2025-08-09 | End: 2025-08-20

## 2025-08-09 RX ORDER — TRAMADOL HYDROCHLORIDE 50 MG/1
50 TABLET, FILM COATED ORAL EVERY 6 HOURS PRN
Qty: 28 TABLET | Refills: 0 | Status: SHIPPED | OUTPATIENT
Start: 2025-08-09 | End: 2025-08-20

## 2025-08-09 RX ORDER — POLYETHYLENE GLYCOL 3350 17 G/17G
17 POWDER, FOR SOLUTION ORAL DAILY
Qty: 510 G | Refills: 0 | Status: SHIPPED | OUTPATIENT
Start: 2025-08-09 | End: 2025-09-08

## 2025-08-09 RX ORDER — CELECOXIB 200 MG/1
200 CAPSULE ORAL 2 TIMES DAILY
Qty: 56 CAPSULE | Refills: 0 | Status: SHIPPED | OUTPATIENT
Start: 2025-08-09 | End: 2025-09-10

## 2025-08-09 RX ORDER — ASPIRIN 81 MG/1
81 TABLET ORAL 2 TIMES DAILY
Qty: 84 TABLET | Refills: 0 | Status: SHIPPED | OUTPATIENT
Start: 2025-08-09 | End: 2025-09-20

## 2025-08-10 ENCOUNTER — HOME HEALTH ADMISSION (OUTPATIENT)
Dept: HOME HEALTH SERVICES | Facility: HOME HEALTH | Age: 75
End: 2025-08-10
Payer: MEDICARE

## 2025-08-12 ENCOUNTER — ANESTHESIA EVENT (OUTPATIENT)
Dept: OPERATING ROOM | Facility: HOSPITAL | Age: 75
End: 2025-08-12
Payer: MEDICARE

## 2025-08-13 ENCOUNTER — ANESTHESIA (OUTPATIENT)
Dept: OPERATING ROOM | Facility: HOSPITAL | Age: 75
End: 2025-08-13
Payer: MEDICARE

## 2025-08-13 ENCOUNTER — APPOINTMENT (OUTPATIENT)
Dept: RADIOLOGY | Facility: HOSPITAL | Age: 75
End: 2025-08-13
Payer: MEDICARE

## 2025-08-13 ENCOUNTER — PHARMACY VISIT (OUTPATIENT)
Dept: PHARMACY | Facility: CLINIC | Age: 75
End: 2025-08-13
Payer: COMMERCIAL

## 2025-08-13 ENCOUNTER — HOSPITAL ENCOUNTER (OUTPATIENT)
Facility: HOSPITAL | Age: 75
Discharge: HOME HEALTH | End: 2025-08-14
Attending: STUDENT IN AN ORGANIZED HEALTH CARE EDUCATION/TRAINING PROGRAM | Admitting: STUDENT IN AN ORGANIZED HEALTH CARE EDUCATION/TRAINING PROGRAM
Payer: MEDICARE

## 2025-08-13 DIAGNOSIS — Z96.651 HISTORY OF TOTAL KNEE ARTHROPLASTY, RIGHT: Primary | ICD-10-CM

## 2025-08-13 DIAGNOSIS — M17.11 ARTHRITIS OF RIGHT KNEE: ICD-10-CM

## 2025-08-13 PROBLEM — Z98.890 PONV (POSTOPERATIVE NAUSEA AND VOMITING): Status: ACTIVE | Noted: 2025-08-13

## 2025-08-13 PROBLEM — R11.2 PONV (POSTOPERATIVE NAUSEA AND VOMITING): Status: ACTIVE | Noted: 2025-08-13

## 2025-08-13 PROCEDURE — C1776 JOINT DEVICE (IMPLANTABLE): HCPCS | Performed by: STUDENT IN AN ORGANIZED HEALTH CARE EDUCATION/TRAINING PROGRAM

## 2025-08-13 PROCEDURE — 7100000001 HC RECOVERY ROOM TIME - INITIAL BASE CHARGE: Performed by: STUDENT IN AN ORGANIZED HEALTH CARE EDUCATION/TRAINING PROGRAM

## 2025-08-13 PROCEDURE — 9420000001 HC RT PATIENT EDUCATION 5 MIN

## 2025-08-13 PROCEDURE — RXMED WILLOW AMBULATORY MEDICATION CHARGE

## 2025-08-13 PROCEDURE — C1713 ANCHOR/SCREW BN/BN,TIS/BN: HCPCS | Performed by: STUDENT IN AN ORGANIZED HEALTH CARE EDUCATION/TRAINING PROGRAM

## 2025-08-13 PROCEDURE — A27447 PR TOTAL KNEE ARTHROPLASTY: Performed by: NURSE ANESTHETIST, CERTIFIED REGISTERED

## 2025-08-13 PROCEDURE — 64473 LWR XTR FSCL PLN BLK UNI NJX: CPT | Mod: XP,RT

## 2025-08-13 PROCEDURE — 2500000005 HC RX 250 GENERAL PHARMACY W/O HCPCS: Performed by: STUDENT IN AN ORGANIZED HEALTH CARE EDUCATION/TRAINING PROGRAM

## 2025-08-13 PROCEDURE — 2500000004 HC RX 250 GENERAL PHARMACY W/ HCPCS (ALT 636 FOR OP/ED): Performed by: NURSE ANESTHETIST, CERTIFIED REGISTERED

## 2025-08-13 PROCEDURE — 2720000007 HC OR 272 NO HCPCS: Performed by: STUDENT IN AN ORGANIZED HEALTH CARE EDUCATION/TRAINING PROGRAM

## 2025-08-13 PROCEDURE — 7100000002 HC RECOVERY ROOM TIME - EACH INCREMENTAL 1 MINUTE: Performed by: STUDENT IN AN ORGANIZED HEALTH CARE EDUCATION/TRAINING PROGRAM

## 2025-08-13 PROCEDURE — 7100000011 HC EXTENDED STAY RECOVERY HOURLY - NURSING UNIT

## 2025-08-13 PROCEDURE — 2780000003 HC OR 278 NO HCPCS: Performed by: STUDENT IN AN ORGANIZED HEALTH CARE EDUCATION/TRAINING PROGRAM

## 2025-08-13 PROCEDURE — 2500000004 HC RX 250 GENERAL PHARMACY W/ HCPCS (ALT 636 FOR OP/ED): Performed by: STUDENT IN AN ORGANIZED HEALTH CARE EDUCATION/TRAINING PROGRAM

## 2025-08-13 PROCEDURE — 2500000001 HC RX 250 WO HCPCS SELF ADMINISTERED DRUGS (ALT 637 FOR MEDICARE OP): Performed by: STUDENT IN AN ORGANIZED HEALTH CARE EDUCATION/TRAINING PROGRAM

## 2025-08-13 PROCEDURE — 99100 ANES PT EXTEME AGE<1 YR&>70: CPT | Performed by: STUDENT IN AN ORGANIZED HEALTH CARE EDUCATION/TRAINING PROGRAM

## 2025-08-13 PROCEDURE — 73560 X-RAY EXAM OF KNEE 1 OR 2: CPT | Mod: RT

## 2025-08-13 PROCEDURE — 97161 PT EVAL LOW COMPLEX 20 MIN: CPT | Mod: GP

## 2025-08-13 PROCEDURE — 2500000005 HC RX 250 GENERAL PHARMACY W/O HCPCS: Performed by: NURSE ANESTHETIST, CERTIFIED REGISTERED

## 2025-08-13 PROCEDURE — A27447 PR TOTAL KNEE ARTHROPLASTY: Performed by: STUDENT IN AN ORGANIZED HEALTH CARE EDUCATION/TRAINING PROGRAM

## 2025-08-13 PROCEDURE — 97530 THERAPEUTIC ACTIVITIES: CPT | Mod: GP

## 2025-08-13 PROCEDURE — 3600000017 HC OR TIME - EACH INCREMENTAL 1 MINUTE - PROCEDURE LEVEL SIX: Performed by: STUDENT IN AN ORGANIZED HEALTH CARE EDUCATION/TRAINING PROGRAM

## 2025-08-13 PROCEDURE — 27447 TOTAL KNEE ARTHROPLASTY: CPT | Performed by: STUDENT IN AN ORGANIZED HEALTH CARE EDUCATION/TRAINING PROGRAM

## 2025-08-13 PROCEDURE — 3700000002 HC GENERAL ANESTHESIA TIME - EACH INCREMENTAL 1 MINUTE: Performed by: STUDENT IN AN ORGANIZED HEALTH CARE EDUCATION/TRAINING PROGRAM

## 2025-08-13 PROCEDURE — 3600000018 HC OR TIME - INITIAL BASE CHARGE - PROCEDURE LEVEL SIX: Performed by: STUDENT IN AN ORGANIZED HEALTH CARE EDUCATION/TRAINING PROGRAM

## 2025-08-13 PROCEDURE — 0055T BONE SRGRY CMPTR CT/MRI IMAG: CPT | Performed by: STUDENT IN AN ORGANIZED HEALTH CARE EDUCATION/TRAINING PROGRAM

## 2025-08-13 PROCEDURE — 99221 1ST HOSP IP/OBS SF/LOW 40: CPT | Performed by: STUDENT IN AN ORGANIZED HEALTH CARE EDUCATION/TRAINING PROGRAM

## 2025-08-13 PROCEDURE — 64447 NJX AA&/STRD FEMORAL NRV IMG: CPT | Performed by: STUDENT IN AN ORGANIZED HEALTH CARE EDUCATION/TRAINING PROGRAM

## 2025-08-13 PROCEDURE — 3700000001 HC GENERAL ANESTHESIA TIME - INITIAL BASE CHARGE: Performed by: STUDENT IN AN ORGANIZED HEALTH CARE EDUCATION/TRAINING PROGRAM

## 2025-08-13 PROCEDURE — A4649 SURGICAL SUPPLIES: HCPCS | Performed by: STUDENT IN AN ORGANIZED HEALTH CARE EDUCATION/TRAINING PROGRAM

## 2025-08-13 PROCEDURE — 2500000002 HC RX 250 W HCPCS SELF ADMINISTERED DRUGS (ALT 637 FOR MEDICARE OP, ALT 636 FOR OP/ED): Performed by: STUDENT IN AN ORGANIZED HEALTH CARE EDUCATION/TRAINING PROGRAM

## 2025-08-13 PROCEDURE — 97110 THERAPEUTIC EXERCISES: CPT | Mod: GP

## 2025-08-13 PROCEDURE — 73560 X-RAY EXAM OF KNEE 1 OR 2: CPT | Mod: RIGHT SIDE | Performed by: RADIOLOGY

## 2025-08-13 DEVICE — CEMENT, BONE, SIMPLEX HV FULL DOSE W/GENTAMYCIN 40 GM: Type: IMPLANTABLE DEVICE | Site: KNEE | Status: FUNCTIONAL

## 2025-08-13 DEVICE — BASEPLATE, TIBIA 3 TS TRIATH: Type: IMPLANTABLE DEVICE | Site: KNEE | Status: FUNCTIONAL

## 2025-08-13 DEVICE — IMPLANTABLE DEVICE: Type: IMPLANTABLE DEVICE | Site: KNEE | Status: FUNCTIONAL

## 2025-08-13 DEVICE — PATELLA, TRIATHLON, ASYMMETRIC X3, SZ-A29 9MM: Type: IMPLANTABLE DEVICE | Site: KNEE | Status: FUNCTIONAL

## 2025-08-13 RX ORDER — CEFAZOLIN SODIUM 2 G/100ML
2 INJECTION, SOLUTION INTRAVENOUS EVERY 8 HOURS
Status: COMPLETED | OUTPATIENT
Start: 2025-08-13 | End: 2025-08-13

## 2025-08-13 RX ORDER — TRANEXAMIC ACID 1 G/10ML
INJECTION, SOLUTION INTRAVENOUS AS NEEDED
Status: DISCONTINUED | OUTPATIENT
Start: 2025-08-13 | End: 2025-08-13

## 2025-08-13 RX ORDER — BISACODYL 10 MG/1
10 SUPPOSITORY RECTAL DAILY PRN
Status: DISCONTINUED | OUTPATIENT
Start: 2025-08-13 | End: 2025-08-14 | Stop reason: HOSPADM

## 2025-08-13 RX ORDER — CALCIUM CARBONATE 500(1250)
1250 TABLET ORAL
Status: DISCONTINUED | OUTPATIENT
Start: 2025-08-13 | End: 2025-08-14 | Stop reason: HOSPADM

## 2025-08-13 RX ORDER — KETOROLAC TROMETHAMINE 15 MG/ML
15 INJECTION, SOLUTION INTRAMUSCULAR; INTRAVENOUS EVERY 6 HOURS
Status: DISPENSED | OUTPATIENT
Start: 2025-08-13 | End: 2025-08-14

## 2025-08-13 RX ORDER — ACETAMINOPHEN 325 MG/1
650 TABLET ORAL EVERY 4 HOURS PRN
Status: DISCONTINUED | OUTPATIENT
Start: 2025-08-13 | End: 2025-08-14 | Stop reason: HOSPADM

## 2025-08-13 RX ORDER — HYDRALAZINE HYDROCHLORIDE 20 MG/ML
5 INJECTION INTRAMUSCULAR; INTRAVENOUS EVERY 30 MIN PRN
Status: DISCONTINUED | OUTPATIENT
Start: 2025-08-13 | End: 2025-08-13 | Stop reason: HOSPADM

## 2025-08-13 RX ORDER — LIDOCAINE HYDROCHLORIDE 10 MG/ML
INJECTION, SOLUTION EPIDURAL; INFILTRATION; INTRACAUDAL; PERINEURAL AS NEEDED
Status: DISCONTINUED | OUTPATIENT
Start: 2025-08-13 | End: 2025-08-13

## 2025-08-13 RX ORDER — FENTANYL CITRATE 50 UG/ML
25 INJECTION, SOLUTION INTRAMUSCULAR; INTRAVENOUS EVERY 5 MIN PRN
Status: DISCONTINUED | OUTPATIENT
Start: 2025-08-13 | End: 2025-08-13 | Stop reason: HOSPADM

## 2025-08-13 RX ORDER — CEFADROXIL 500 MG/1
500 CAPSULE ORAL 2 TIMES DAILY
Status: DISCONTINUED | OUTPATIENT
Start: 2025-08-14 | End: 2025-08-14 | Stop reason: HOSPADM

## 2025-08-13 RX ORDER — MEPERIDINE HYDROCHLORIDE 25 MG/ML
12.5 INJECTION INTRAMUSCULAR; INTRAVENOUS; SUBCUTANEOUS EVERY 10 MIN PRN
Status: DISCONTINUED | OUTPATIENT
Start: 2025-08-13 | End: 2025-08-13 | Stop reason: HOSPADM

## 2025-08-13 RX ORDER — POLYETHYLENE GLYCOL 3350 17 G/17G
17 POWDER, FOR SOLUTION ORAL DAILY
Status: DISCONTINUED | OUTPATIENT
Start: 2025-08-13 | End: 2025-08-14 | Stop reason: HOSPADM

## 2025-08-13 RX ORDER — TRANEXAMIC ACID 650 MG/1
1950 TABLET ORAL ONCE
Status: COMPLETED | OUTPATIENT
Start: 2025-08-13 | End: 2025-08-13

## 2025-08-13 RX ORDER — OMEPRAZOLE 20 MG/1
20 TABLET, DELAYED RELEASE ORAL
Status: DISCONTINUED | OUTPATIENT
Start: 2025-08-14 | End: 2025-08-13

## 2025-08-13 RX ORDER — ONDANSETRON 4 MG/1
4 TABLET, ORALLY DISINTEGRATING ORAL EVERY 8 HOURS PRN
Status: DISCONTINUED | OUTPATIENT
Start: 2025-08-13 | End: 2025-08-14 | Stop reason: HOSPADM

## 2025-08-13 RX ORDER — BISACODYL 5 MG
10 TABLET, DELAYED RELEASE (ENTERIC COATED) ORAL DAILY PRN
Status: DISCONTINUED | OUTPATIENT
Start: 2025-08-13 | End: 2025-08-14 | Stop reason: HOSPADM

## 2025-08-13 RX ORDER — LIDOCAINE 560 MG/1
1 PATCH PERCUTANEOUS; TOPICAL; TRANSDERMAL DAILY
Status: DISCONTINUED | OUTPATIENT
Start: 2025-08-13 | End: 2025-08-14 | Stop reason: HOSPADM

## 2025-08-13 RX ORDER — NALOXONE HYDROCHLORIDE 0.4 MG/ML
0.2 INJECTION, SOLUTION INTRAMUSCULAR; INTRAVENOUS; SUBCUTANEOUS EVERY 5 MIN PRN
Status: DISCONTINUED | OUTPATIENT
Start: 2025-08-13 | End: 2025-08-14 | Stop reason: HOSPADM

## 2025-08-13 RX ORDER — OXYCODONE HYDROCHLORIDE 5 MG/1
5 TABLET ORAL EVERY 6 HOURS PRN
Status: DISCONTINUED | OUTPATIENT
Start: 2025-08-13 | End: 2025-08-13

## 2025-08-13 RX ORDER — SODIUM CHLORIDE, SODIUM LACTATE, POTASSIUM CHLORIDE, CALCIUM CHLORIDE 600; 310; 30; 20 MG/100ML; MG/100ML; MG/100ML; MG/100ML
75 INJECTION, SOLUTION INTRAVENOUS CONTINUOUS
Status: ACTIVE | OUTPATIENT
Start: 2025-08-13 | End: 2025-08-13

## 2025-08-13 RX ORDER — PHENYLEPHRINE HCL IN 0.9% NACL 1 MG/10 ML
SYRINGE (ML) INTRAVENOUS AS NEEDED
Status: DISCONTINUED | OUTPATIENT
Start: 2025-08-13 | End: 2025-08-13

## 2025-08-13 RX ORDER — TALC
3 POWDER (GRAM) TOPICAL NIGHTLY PRN
Status: DISCONTINUED | OUTPATIENT
Start: 2025-08-13 | End: 2025-08-14 | Stop reason: HOSPADM

## 2025-08-13 RX ORDER — CYCLOBENZAPRINE HCL 10 MG
5 TABLET ORAL 3 TIMES DAILY PRN
Status: DISCONTINUED | OUTPATIENT
Start: 2025-08-13 | End: 2025-08-14 | Stop reason: HOSPADM

## 2025-08-13 RX ORDER — FENTANYL CITRATE 50 UG/ML
INJECTION, SOLUTION INTRAMUSCULAR; INTRAVENOUS AS NEEDED
Status: DISCONTINUED | OUTPATIENT
Start: 2025-08-13 | End: 2025-08-13

## 2025-08-13 RX ORDER — PHENYLEPHRINE 10 MG/250 ML(40 MCG/ML)IN 0.9 % SOD.CHLORIDE INTRAVENOUS
CONTINUOUS PRN
Status: DISCONTINUED | OUTPATIENT
Start: 2025-08-13 | End: 2025-08-13

## 2025-08-13 RX ORDER — FAMOTIDINE 20 MG/1
20 TABLET, FILM COATED ORAL DAILY PRN
Status: DISCONTINUED | OUTPATIENT
Start: 2025-08-13 | End: 2025-08-14 | Stop reason: HOSPADM

## 2025-08-13 RX ORDER — GLYCOPYRROLATE 0.2 MG/ML
INJECTION INTRAMUSCULAR; INTRAVENOUS AS NEEDED
Status: DISCONTINUED | OUTPATIENT
Start: 2025-08-13 | End: 2025-08-13

## 2025-08-13 RX ORDER — OXYCODONE HYDROCHLORIDE 5 MG/1
5 TABLET ORAL ONCE AS NEEDED
Status: DISCONTINUED | OUTPATIENT
Start: 2025-08-13 | End: 2025-08-13 | Stop reason: HOSPADM

## 2025-08-13 RX ORDER — ACETAMINOPHEN 325 MG/1
975 TABLET ORAL ONCE
Status: COMPLETED | OUTPATIENT
Start: 2025-08-13 | End: 2025-08-13

## 2025-08-13 RX ORDER — CETIRIZINE HYDROCHLORIDE 10 MG/1
10 TABLET ORAL DAILY
Status: DISCONTINUED | OUTPATIENT
Start: 2025-08-13 | End: 2025-08-14 | Stop reason: HOSPADM

## 2025-08-13 RX ORDER — BUPIVACAINE HYDROCHLORIDE 7.5 MG/ML
INJECTION INTRAVENOUS AS NEEDED
Status: DISCONTINUED | OUTPATIENT
Start: 2025-08-13 | End: 2025-08-13

## 2025-08-13 RX ORDER — SODIUM CHLORIDE, SODIUM LACTATE, POTASSIUM CHLORIDE, CALCIUM CHLORIDE 600; 310; 30; 20 MG/100ML; MG/100ML; MG/100ML; MG/100ML
50 INJECTION, SOLUTION INTRAVENOUS CONTINUOUS
Status: ACTIVE | OUTPATIENT
Start: 2025-08-13 | End: 2025-08-14

## 2025-08-13 RX ORDER — DEXAMETHASONE SODIUM PHOSPHATE 10 MG/ML
5 INJECTION INTRAMUSCULAR; INTRAVENOUS ONCE
Status: COMPLETED | OUTPATIENT
Start: 2025-08-13 | End: 2025-08-13

## 2025-08-13 RX ORDER — ASPIRIN 81 MG/1
81 TABLET ORAL 2 TIMES DAILY
Status: DISCONTINUED | OUTPATIENT
Start: 2025-08-13 | End: 2025-08-14 | Stop reason: HOSPADM

## 2025-08-13 RX ORDER — SCOPOLAMINE 1 MG/3D
1 PATCH, EXTENDED RELEASE TRANSDERMAL
Status: DISCONTINUED | OUTPATIENT
Start: 2025-08-13 | End: 2025-08-14 | Stop reason: HOSPADM

## 2025-08-13 RX ORDER — IPRATROPIUM BROMIDE 0.5 MG/2.5ML
500 SOLUTION RESPIRATORY (INHALATION) AS NEEDED
Status: DISCONTINUED | OUTPATIENT
Start: 2025-08-13 | End: 2025-08-13 | Stop reason: HOSPADM

## 2025-08-13 RX ORDER — FENTANYL CITRATE 50 UG/ML
50 INJECTION, SOLUTION INTRAMUSCULAR; INTRAVENOUS ONCE
Status: COMPLETED | OUTPATIENT
Start: 2025-08-13 | End: 2025-08-13

## 2025-08-13 RX ORDER — DIPHENHYDRAMINE HYDROCHLORIDE 50 MG/ML
12.5 INJECTION, SOLUTION INTRAMUSCULAR; INTRAVENOUS ONCE AS NEEDED
Status: DISCONTINUED | OUTPATIENT
Start: 2025-08-13 | End: 2025-08-13 | Stop reason: HOSPADM

## 2025-08-13 RX ORDER — ROPIVACAINE/EPI/CLONIDINE/KET 2.46-0.005
SYRINGE (ML) INJECTION AS NEEDED
Status: DISCONTINUED | OUTPATIENT
Start: 2025-08-13 | End: 2025-08-13 | Stop reason: HOSPADM

## 2025-08-13 RX ORDER — OXYCODONE HYDROCHLORIDE 5 MG/1
5 TABLET ORAL EVERY 4 HOURS PRN
Refills: 0 | Status: DISCONTINUED | OUTPATIENT
Start: 2025-08-13 | End: 2025-08-14 | Stop reason: HOSPADM

## 2025-08-13 RX ORDER — L. ACIDOPHILUS/L.BULGARICUS 1MM CELL
1 TABLET ORAL DAILY
Status: DISCONTINUED | OUTPATIENT
Start: 2025-08-13 | End: 2025-08-14 | Stop reason: HOSPADM

## 2025-08-13 RX ORDER — PROPOFOL 10 MG/ML
INJECTION, EMULSION INTRAVENOUS AS NEEDED
Status: DISCONTINUED | OUTPATIENT
Start: 2025-08-13 | End: 2025-08-13

## 2025-08-13 RX ORDER — MIDAZOLAM HYDROCHLORIDE 1 MG/ML
2 INJECTION, SOLUTION INTRAMUSCULAR; INTRAVENOUS ONCE
Status: COMPLETED | OUTPATIENT
Start: 2025-08-13 | End: 2025-08-13

## 2025-08-13 RX ORDER — ONDANSETRON HYDROCHLORIDE 2 MG/ML
INJECTION, SOLUTION INTRAVENOUS AS NEEDED
Status: DISCONTINUED | OUTPATIENT
Start: 2025-08-13 | End: 2025-08-13

## 2025-08-13 RX ORDER — ALUMINUM HYDROXIDE, MAGNESIUM HYDROXIDE, AND SIMETHICONE 1200; 120; 1200 MG/30ML; MG/30ML; MG/30ML
20 SUSPENSION ORAL EVERY 4 HOURS PRN
Status: DISCONTINUED | OUTPATIENT
Start: 2025-08-13 | End: 2025-08-14 | Stop reason: HOSPADM

## 2025-08-13 RX ORDER — ALBUTEROL SULFATE 0.83 MG/ML
2.5 SOLUTION RESPIRATORY (INHALATION) ONCE AS NEEDED
Status: DISCONTINUED | OUTPATIENT
Start: 2025-08-13 | End: 2025-08-13 | Stop reason: HOSPADM

## 2025-08-13 RX ORDER — CHOLECALCIFEROL (VITAMIN D3) 25 MCG
25 TABLET ORAL DAILY
Status: DISCONTINUED | OUTPATIENT
Start: 2025-08-13 | End: 2025-08-14 | Stop reason: HOSPADM

## 2025-08-13 RX ORDER — PANTOPRAZOLE SODIUM 40 MG/1
40 TABLET, DELAYED RELEASE ORAL
Status: DISCONTINUED | OUTPATIENT
Start: 2025-08-14 | End: 2025-08-14 | Stop reason: HOSPADM

## 2025-08-13 RX ORDER — SODIUM CHLORIDE, SODIUM LACTATE, POTASSIUM CHLORIDE, CALCIUM CHLORIDE 600; 310; 30; 20 MG/100ML; MG/100ML; MG/100ML; MG/100ML
100 INJECTION, SOLUTION INTRAVENOUS CONTINUOUS
Status: DISCONTINUED | OUTPATIENT
Start: 2025-08-13 | End: 2025-08-13 | Stop reason: HOSPADM

## 2025-08-13 RX ORDER — DOCUSATE SODIUM 100 MG/1
100 CAPSULE, LIQUID FILLED ORAL 2 TIMES DAILY
Status: DISCONTINUED | OUTPATIENT
Start: 2025-08-13 | End: 2025-08-14 | Stop reason: HOSPADM

## 2025-08-13 RX ORDER — FENTANYL CITRATE 50 UG/ML
50 INJECTION, SOLUTION INTRAMUSCULAR; INTRAVENOUS EVERY 5 MIN PRN
Status: DISCONTINUED | OUTPATIENT
Start: 2025-08-13 | End: 2025-08-13 | Stop reason: HOSPADM

## 2025-08-13 RX ORDER — CELECOXIB 200 MG/1
200 CAPSULE ORAL ONCE
Status: COMPLETED | OUTPATIENT
Start: 2025-08-13 | End: 2025-08-13

## 2025-08-13 RX ORDER — LABETALOL HYDROCHLORIDE 5 MG/ML
5 INJECTION, SOLUTION INTRAVENOUS ONCE AS NEEDED
Status: DISCONTINUED | OUTPATIENT
Start: 2025-08-13 | End: 2025-08-13 | Stop reason: HOSPADM

## 2025-08-13 RX ORDER — PRAVASTATIN SODIUM 20 MG/1
20 TABLET ORAL NIGHTLY
Status: DISCONTINUED | OUTPATIENT
Start: 2025-08-13 | End: 2025-08-14 | Stop reason: HOSPADM

## 2025-08-13 RX ORDER — PREGABALIN 75 MG/1
75 CAPSULE ORAL ONCE
Status: COMPLETED | OUTPATIENT
Start: 2025-08-13 | End: 2025-08-13

## 2025-08-13 RX ORDER — SIMETHICONE 80 MG
80 TABLET,CHEWABLE ORAL 4 TIMES DAILY PRN
Status: DISCONTINUED | OUTPATIENT
Start: 2025-08-13 | End: 2025-08-14 | Stop reason: HOSPADM

## 2025-08-13 RX ORDER — ONDANSETRON HYDROCHLORIDE 2 MG/ML
4 INJECTION, SOLUTION INTRAVENOUS EVERY 8 HOURS PRN
Status: DISCONTINUED | OUTPATIENT
Start: 2025-08-13 | End: 2025-08-14 | Stop reason: HOSPADM

## 2025-08-13 RX ORDER — OXYCODONE HYDROCHLORIDE 5 MG/1
10 TABLET ORAL EVERY 4 HOURS PRN
Status: DISCONTINUED | OUTPATIENT
Start: 2025-08-13 | End: 2025-08-14 | Stop reason: HOSPADM

## 2025-08-13 RX ORDER — KETOROLAC TROMETHAMINE 30 MG/ML
INJECTION, SOLUTION INTRAMUSCULAR; INTRAVENOUS AS NEEDED
Status: DISCONTINUED | OUTPATIENT
Start: 2025-08-13 | End: 2025-08-13

## 2025-08-13 RX ORDER — ONDANSETRON HYDROCHLORIDE 2 MG/ML
4 INJECTION, SOLUTION INTRAVENOUS ONCE AS NEEDED
Status: DISCONTINUED | OUTPATIENT
Start: 2025-08-13 | End: 2025-08-13 | Stop reason: HOSPADM

## 2025-08-13 RX ORDER — CEFAZOLIN SODIUM 2 G/100ML
2 INJECTION, SOLUTION INTRAVENOUS ONCE
Status: COMPLETED | OUTPATIENT
Start: 2025-08-13 | End: 2025-08-13

## 2025-08-13 RX ORDER — METOCLOPRAMIDE 10 MG/1
10 TABLET ORAL EVERY 6 HOURS PRN
Status: DISCONTINUED | OUTPATIENT
Start: 2025-08-13 | End: 2025-08-14 | Stop reason: HOSPADM

## 2025-08-13 RX ORDER — METOCLOPRAMIDE HYDROCHLORIDE 5 MG/ML
10 INJECTION INTRAMUSCULAR; INTRAVENOUS EVERY 6 HOURS PRN
Status: DISCONTINUED | OUTPATIENT
Start: 2025-08-13 | End: 2025-08-14 | Stop reason: HOSPADM

## 2025-08-13 RX ORDER — PREGABALIN 75 MG/1
75 CAPSULE ORAL 2 TIMES DAILY
Status: DISCONTINUED | OUTPATIENT
Start: 2025-08-13 | End: 2025-08-14 | Stop reason: HOSPADM

## 2025-08-13 RX ADMIN — POLYETHYLENE GLYCOL 3350 17 G: 17 POWDER, FOR SOLUTION ORAL at 11:43

## 2025-08-13 RX ADMIN — Medication 100 MCG: at 07:52

## 2025-08-13 RX ADMIN — Medication 50 MCG: at 07:44

## 2025-08-13 RX ADMIN — ASPIRIN 81 MG: 81 TABLET, COATED ORAL at 20:46

## 2025-08-13 RX ADMIN — Medication 100 MCG: at 07:57

## 2025-08-13 RX ADMIN — Medication 100 MCG: at 08:10

## 2025-08-13 RX ADMIN — CELECOXIB 200 MG: 200 CAPSULE ORAL at 05:57

## 2025-08-13 RX ADMIN — Medication 100 MCG: at 08:04

## 2025-08-13 RX ADMIN — PROPOFOL 500 MG: 10 INJECTION, EMULSION INTRAVENOUS at 07:27

## 2025-08-13 RX ADMIN — LIDOCAINE HYDROCHLORIDE 5 ML: 10 INJECTION, SOLUTION EPIDURAL; INFILTRATION; INTRACAUDAL; PERINEURAL at 07:27

## 2025-08-13 RX ADMIN — Medication 100 MCG: at 08:02

## 2025-08-13 RX ADMIN — CEFAZOLIN SODIUM 2 G: 2 INJECTION, SOLUTION INTRAVENOUS at 14:15

## 2025-08-13 RX ADMIN — SCOPOLAMINE 1 PATCH: 1.5 PATCH, EXTENDED RELEASE TRANSDERMAL at 05:57

## 2025-08-13 RX ADMIN — DEXAMETHASONE SODIUM PHOSPHATE 5 MG: 10 INJECTION INTRAMUSCULAR; INTRAVENOUS at 17:31

## 2025-08-13 RX ADMIN — GLYCOPYRROLATE 0.2 MG: 0.2 INJECTION, SOLUTION INTRAMUSCULAR; INTRAVENOUS at 08:07

## 2025-08-13 RX ADMIN — FENTANYL CITRATE 50 MCG: 0.05 INJECTION, SOLUTION INTRAMUSCULAR; INTRAVENOUS at 07:27

## 2025-08-13 RX ADMIN — ONDANSETRON 4 MG: 2 INJECTION, SOLUTION INTRAMUSCULAR; INTRAVENOUS at 09:14

## 2025-08-13 RX ADMIN — CALCIUM 1 TABLET: 500 TABLET ORAL at 17:39

## 2025-08-13 RX ADMIN — PREGABALIN 75 MG: 75 CAPSULE ORAL at 05:57

## 2025-08-13 RX ADMIN — CEFAZOLIN SODIUM 2 G: 2 INJECTION, SOLUTION INTRAVENOUS at 22:50

## 2025-08-13 RX ADMIN — DOCUSATE SODIUM 100 MG: 100 CAPSULE, LIQUID FILLED ORAL at 20:46

## 2025-08-13 RX ADMIN — TRANEXAMIC ACID 1000 MG: 100 INJECTION, SOLUTION INTRAVENOUS at 07:28

## 2025-08-13 RX ADMIN — GLYCOPYRROLATE 0.2 MG: 0.2 INJECTION, SOLUTION INTRAMUSCULAR; INTRAVENOUS at 09:11

## 2025-08-13 RX ADMIN — Medication 50 MCG: at 07:39

## 2025-08-13 RX ADMIN — Medication 200 MCG: at 08:15

## 2025-08-13 RX ADMIN — SODIUM CHLORIDE, SODIUM LACTATE, POTASSIUM CHLORIDE, AND CALCIUM CHLORIDE: 600; 310; 30; 20 INJECTION, SOLUTION INTRAVENOUS at 08:35

## 2025-08-13 RX ADMIN — FENTANYL CITRATE 50 MCG: 50 INJECTION INTRAMUSCULAR; INTRAVENOUS at 06:57

## 2025-08-13 RX ADMIN — PRAVASTATIN SODIUM 20 MG: 20 TABLET ORAL at 20:46

## 2025-08-13 RX ADMIN — POVIDONE-IODINE 1 APPLICATION: 5 SOLUTION TOPICAL at 05:57

## 2025-08-13 RX ADMIN — PREGABALIN 75 MG: 75 CAPSULE ORAL at 20:46

## 2025-08-13 RX ADMIN — Medication 100 MCG: at 09:10

## 2025-08-13 RX ADMIN — KETOROLAC TROMETHAMINE 15 MG: 15 INJECTION, SOLUTION INTRAMUSCULAR; INTRAVENOUS at 22:50

## 2025-08-13 RX ADMIN — BUPIVACAINE HYDROCHLORIDE IN DEXTROSE 1.2 ML: 7.5 INJECTION, SOLUTION SUBARACHNOID at 07:25

## 2025-08-13 RX ADMIN — Medication 1 TABLET: at 11:44

## 2025-08-13 RX ADMIN — CEFAZOLIN SODIUM 2 G: 2 INJECTION, SOLUTION INTRAVENOUS at 07:06

## 2025-08-13 RX ADMIN — Medication 25 MCG: at 11:44

## 2025-08-13 RX ADMIN — KETOROLAC TROMETHAMINE 15 MG: 30 INJECTION, SOLUTION INTRAMUSCULAR at 09:14

## 2025-08-13 RX ADMIN — ASPIRIN 81 MG: 81 TABLET, COATED ORAL at 11:46

## 2025-08-13 RX ADMIN — PHENYLEPHRINE-NACL IV SOLUTION 10 MG/250ML-0.9% 0.6 MCG/KG/MIN: 10-0.9/25 SOLUTION at 08:19

## 2025-08-13 RX ADMIN — DOCUSATE SODIUM 100 MG: 100 CAPSULE, LIQUID FILLED ORAL at 13:01

## 2025-08-13 RX ADMIN — FAMOTIDINE 20 MG: 20 TABLET, FILM COATED ORAL at 16:45

## 2025-08-13 RX ADMIN — METOCLOPRAMIDE 10 MG: 5 INJECTION, SOLUTION INTRAMUSCULAR; INTRAVENOUS at 11:44

## 2025-08-13 RX ADMIN — TRANEXAMIC ACID 1950 MG: 650 TABLET ORAL at 17:39

## 2025-08-13 RX ADMIN — TRANEXAMIC ACID 1000 MG: 100 INJECTION, SOLUTION INTRAVENOUS at 09:14

## 2025-08-13 RX ADMIN — SODIUM CHLORIDE, SODIUM LACTATE, POTASSIUM CHLORIDE, AND CALCIUM CHLORIDE 75 ML/HR: 600; 310; 30; 20 INJECTION, SOLUTION INTRAVENOUS at 07:02

## 2025-08-13 RX ADMIN — SODIUM CHLORIDE, SODIUM LACTATE, POTASSIUM CHLORIDE, AND CALCIUM CHLORIDE 50 ML/HR: 600; 310; 30; 20 INJECTION, SOLUTION INTRAVENOUS at 11:45

## 2025-08-13 RX ADMIN — KETOROLAC TROMETHAMINE 15 MG: 15 INJECTION, SOLUTION INTRAMUSCULAR; INTRAVENOUS at 11:45

## 2025-08-13 RX ADMIN — Medication 100 MCG: at 08:55

## 2025-08-13 RX ADMIN — ACETAMINOPHEN 975 MG: 325 TABLET ORAL at 05:57

## 2025-08-13 RX ADMIN — Medication 100 MCG: at 07:50

## 2025-08-13 RX ADMIN — MIDAZOLAM 2 MG: 1 INJECTION INTRAMUSCULAR; INTRAVENOUS at 06:57

## 2025-08-13 SDOH — ECONOMIC STABILITY: INCOME INSECURITY: IN THE PAST 12 MONTHS HAS THE ELECTRIC, GAS, OIL, OR WATER COMPANY THREATENED TO SHUT OFF SERVICES IN YOUR HOME?: NO

## 2025-08-13 SDOH — HEALTH STABILITY: MENTAL HEALTH
DO YOU FEEL STRESS - TENSE, RESTLESS, NERVOUS, OR ANXIOUS, OR UNABLE TO SLEEP AT NIGHT BECAUSE YOUR MIND IS TROUBLED ALL THE TIME - THESE DAYS?: NOT AT ALL

## 2025-08-13 SDOH — SOCIAL STABILITY: SOCIAL INSECURITY
WITHIN THE LAST YEAR, HAVE YOU BEEN KICKED, HIT, SLAPPED, OR OTHERWISE PHYSICALLY HURT BY YOUR PARTNER OR EX-PARTNER?: NO

## 2025-08-13 SDOH — HEALTH STABILITY: MENTAL HEALTH: HOW OFTEN DO YOU HAVE SIX OR MORE DRINKS ON ONE OCCASION?: NEVER

## 2025-08-13 SDOH — ECONOMIC STABILITY: HOUSING INSECURITY: AT ANY TIME IN THE PAST 12 MONTHS, WERE YOU HOMELESS OR LIVING IN A SHELTER (INCLUDING NOW)?: NO

## 2025-08-13 SDOH — SOCIAL STABILITY: SOCIAL INSECURITY: WITHIN THE LAST YEAR, HAVE YOU BEEN AFRAID OF YOUR PARTNER OR EX-PARTNER?: NO

## 2025-08-13 SDOH — ECONOMIC STABILITY: FOOD INSECURITY: WITHIN THE PAST 12 MONTHS, THE FOOD YOU BOUGHT JUST DIDN'T LAST AND YOU DIDN'T HAVE MONEY TO GET MORE.: NEVER TRUE

## 2025-08-13 SDOH — HEALTH STABILITY: PHYSICAL HEALTH
HOW OFTEN DO YOU NEED TO HAVE SOMEONE HELP YOU WHEN YOU READ INSTRUCTIONS, PAMPHLETS, OR OTHER WRITTEN MATERIAL FROM YOUR DOCTOR OR PHARMACY?: NEVER

## 2025-08-13 SDOH — HEALTH STABILITY: PHYSICAL HEALTH: ON AVERAGE, HOW MANY DAYS PER WEEK DO YOU ENGAGE IN MODERATE TO STRENUOUS EXERCISE (LIKE A BRISK WALK)?: 5 DAYS

## 2025-08-13 SDOH — ECONOMIC STABILITY: HOUSING INSECURITY: IN THE LAST 12 MONTHS, WAS THERE A TIME WHEN YOU WERE NOT ABLE TO PAY THE MORTGAGE OR RENT ON TIME?: NO

## 2025-08-13 SDOH — SOCIAL STABILITY: SOCIAL INSECURITY: ABUSE: ADULT

## 2025-08-13 SDOH — SOCIAL STABILITY: SOCIAL INSECURITY: ARE YOU MARRIED, WIDOWED, DIVORCED, SEPARATED, NEVER MARRIED, OR LIVING WITH A PARTNER?: MARRIED

## 2025-08-13 SDOH — SOCIAL STABILITY: SOCIAL INSECURITY: HAS ANYONE EVER THREATENED TO HURT YOUR FAMILY OR YOUR PETS?: NO

## 2025-08-13 SDOH — SOCIAL STABILITY: SOCIAL NETWORK: IN A TYPICAL WEEK, HOW MANY TIMES DO YOU TALK ON THE PHONE WITH FAMILY, FRIENDS, OR NEIGHBORS?: NEVER

## 2025-08-13 SDOH — SOCIAL STABILITY: SOCIAL INSECURITY
WITHIN THE LAST YEAR, HAVE YOU BEEN RAPED OR FORCED TO HAVE ANY KIND OF SEXUAL ACTIVITY BY YOUR PARTNER OR EX-PARTNER?: NO

## 2025-08-13 SDOH — SOCIAL STABILITY: SOCIAL INSECURITY: HAVE YOU HAD THOUGHTS OF HARMING ANYONE ELSE?: NO

## 2025-08-13 SDOH — SOCIAL STABILITY: SOCIAL NETWORK: HOW OFTEN DO YOU ATTEND CHURCH OR RELIGIOUS SERVICES?: NEVER

## 2025-08-13 SDOH — ECONOMIC STABILITY: FOOD INSECURITY: HOW HARD IS IT FOR YOU TO PAY FOR THE VERY BASICS LIKE FOOD, HOUSING, MEDICAL CARE, AND HEATING?: NOT VERY HARD

## 2025-08-13 SDOH — HEALTH STABILITY: MENTAL HEALTH: HOW OFTEN DO YOU HAVE A DRINK CONTAINING ALCOHOL?: NEVER

## 2025-08-13 SDOH — ECONOMIC STABILITY: FOOD INSECURITY: WITHIN THE PAST 12 MONTHS, YOU WORRIED THAT YOUR FOOD WOULD RUN OUT BEFORE YOU GOT THE MONEY TO BUY MORE.: NEVER TRUE

## 2025-08-13 SDOH — SOCIAL STABILITY: SOCIAL NETWORK: HOW OFTEN DO YOU ATTEND MEETINGS OF THE CLUBS OR ORGANIZATIONS YOU BELONG TO?: NEVER

## 2025-08-13 SDOH — HEALTH STABILITY: PHYSICAL HEALTH: ON AVERAGE, HOW MANY MINUTES DO YOU ENGAGE IN EXERCISE AT THIS LEVEL?: 40 MIN

## 2025-08-13 SDOH — ECONOMIC STABILITY: TRANSPORTATION INSECURITY: IN THE PAST 12 MONTHS, HAS LACK OF TRANSPORTATION KEPT YOU FROM MEDICAL APPOINTMENTS OR FROM GETTING MEDICATIONS?: NO

## 2025-08-13 SDOH — SOCIAL STABILITY: SOCIAL INSECURITY: WITHIN THE LAST YEAR, HAVE YOU BEEN HUMILIATED OR EMOTIONALLY ABUSED IN OTHER WAYS BY YOUR PARTNER OR EX-PARTNER?: NO

## 2025-08-13 SDOH — SOCIAL STABILITY: SOCIAL INSECURITY: DOES ANYONE TRY TO KEEP YOU FROM HAVING/CONTACTING OTHER FRIENDS OR DOING THINGS OUTSIDE YOUR HOME?: NO

## 2025-08-13 SDOH — ECONOMIC STABILITY: HOUSING INSECURITY: IN THE PAST 12 MONTHS, HOW MANY TIMES HAVE YOU MOVED WHERE YOU WERE LIVING?: 0

## 2025-08-13 SDOH — SOCIAL STABILITY: SOCIAL INSECURITY: ARE YOU OR HAVE YOU BEEN THREATENED OR ABUSED PHYSICALLY, EMOTIONALLY, OR SEXUALLY BY ANYONE?: NO

## 2025-08-13 SDOH — SOCIAL STABILITY: SOCIAL NETWORK: HOW OFTEN DO YOU GET TOGETHER WITH FRIENDS OR RELATIVES?: NEVER

## 2025-08-13 SDOH — SOCIAL STABILITY: SOCIAL INSECURITY: DO YOU FEEL ANYONE HAS EXPLOITED OR TAKEN ADVANTAGE OF YOU FINANCIALLY OR OF YOUR PERSONAL PROPERTY?: NO

## 2025-08-13 SDOH — SOCIAL STABILITY: SOCIAL INSECURITY: WERE YOU ABLE TO COMPLETE ALL THE BEHAVIORAL HEALTH SCREENINGS?: YES

## 2025-08-13 SDOH — ECONOMIC STABILITY: HOUSING INSECURITY: DO YOU FEEL UNSAFE GOING BACK TO THE PLACE WHERE YOU LIVE?: NO

## 2025-08-13 SDOH — SOCIAL STABILITY: SOCIAL INSECURITY: HAVE YOU HAD ANY THOUGHTS OF HARMING ANYONE ELSE?: NO

## 2025-08-13 SDOH — SOCIAL STABILITY: SOCIAL INSECURITY: ARE THERE ANY APPARENT SIGNS OF INJURIES/BEHAVIORS THAT COULD BE RELATED TO ABUSE/NEGLECT?: NO

## 2025-08-13 SDOH — SOCIAL STABILITY: SOCIAL INSECURITY: DO YOU FEEL UNSAFE GOING BACK TO THE PLACE WHERE YOU ARE LIVING?: NO

## 2025-08-13 ASSESSMENT — PAIN - FUNCTIONAL ASSESSMENT
PAIN_FUNCTIONAL_ASSESSMENT: 0-10
PAIN_FUNCTIONAL_ASSESSMENT: WONG-BAKER FACES
PAIN_FUNCTIONAL_ASSESSMENT: WONG-BAKER FACES
PAIN_FUNCTIONAL_ASSESSMENT: 0-10
PAIN_FUNCTIONAL_ASSESSMENT: WONG-BAKER FACES
PAIN_FUNCTIONAL_ASSESSMENT: WONG-BAKER FACES

## 2025-08-13 ASSESSMENT — PAIN SCALES - GENERAL

## 2025-08-13 ASSESSMENT — COGNITIVE AND FUNCTIONAL STATUS - GENERAL
STANDING UP FROM CHAIR USING ARMS: A LITTLE
MOBILITY SCORE: 23
DAILY ACTIVITIY SCORE: 21
PATIENT BASELINE BEDBOUND: NO
DRESSING REGULAR LOWER BODY CLOTHING: A LITTLE
WALKING IN HOSPITAL ROOM: A LITTLE
MOVING FROM LYING ON BACK TO SITTING ON SIDE OF FLAT BED WITH BEDRAILS: A LITTLE
MOBILITY SCORE: 18
TURNING FROM BACK TO SIDE WHILE IN FLAT BAD: A LITTLE
MOVING TO AND FROM BED TO CHAIR: A LITTLE
HELP NEEDED FOR BATHING: A LITTLE
CLIMB 3 TO 5 STEPS WITH RAILING: A LITTLE
CLIMB 3 TO 5 STEPS WITH RAILING: A LITTLE
TOILETING: A LITTLE

## 2025-08-13 ASSESSMENT — ACTIVITIES OF DAILY LIVING (ADL)
ADEQUATE_TO_COMPLETE_ADL: YES
DRESSING YOURSELF: INDEPENDENT
BATHING: INDEPENDENT
HEARING - RIGHT EAR: FUNCTIONAL
LACK_OF_TRANSPORTATION: NO
ADL_ASSISTANCE: INDEPENDENT
ASSISTIVE_DEVICE: WALKER
WALKS IN HOME: INDEPENDENT
GROOMING: INDEPENDENT
LACK_OF_TRANSPORTATION: NO
TOILETING: INDEPENDENT
LACK_OF_TRANSPORTATION: NO
FEEDING YOURSELF: INDEPENDENT
ADLS_ADDRESSED: YES
PATIENT'S MEMORY ADEQUATE TO SAFELY COMPLETE DAILY ACTIVITIES?: YES
JUDGMENT_ADEQUATE_SAFELY_COMPLETE_DAILY_ACTIVITIES: YES
HEARING - LEFT EAR: FUNCTIONAL

## 2025-08-13 ASSESSMENT — LIFESTYLE VARIABLES
SUBSTANCE_ABUSE_PAST_12_MONTHS: NO
HOW MANY STANDARD DRINKS CONTAINING ALCOHOL DO YOU HAVE ON A TYPICAL DAY: PATIENT DOES NOT DRINK
SKIP TO QUESTIONS 9-10: 1
AUDIT-C TOTAL SCORE: 0
AUDIT-C TOTAL SCORE: 0
PRESCIPTION_ABUSE_PAST_12_MONTHS: NO

## 2025-08-13 ASSESSMENT — PATIENT HEALTH QUESTIONNAIRE - PHQ9
1. LITTLE INTEREST OR PLEASURE IN DOING THINGS: NOT AT ALL
2. FEELING DOWN, DEPRESSED OR HOPELESS: NOT AT ALL
SUM OF ALL RESPONSES TO PHQ9 QUESTIONS 1 & 2: 0

## 2025-08-14 ENCOUNTER — DOCUMENTATION (OUTPATIENT)
Dept: HOME HEALTH SERVICES | Facility: HOME HEALTH | Age: 75
End: 2025-08-14
Payer: MEDICARE

## 2025-08-14 VITALS
WEIGHT: 122.58 LBS | SYSTOLIC BLOOD PRESSURE: 141 MMHG | DIASTOLIC BLOOD PRESSURE: 60 MMHG | BODY MASS INDEX: 24.07 KG/M2 | HEART RATE: 58 BPM | TEMPERATURE: 97.5 F | HEIGHT: 60 IN | OXYGEN SATURATION: 94 % | RESPIRATION RATE: 16 BRPM

## 2025-08-14 PROBLEM — R11.2 PONV (POSTOPERATIVE NAUSEA AND VOMITING): Status: RESOLVED | Noted: 2025-08-13 | Resolved: 2025-08-14

## 2025-08-14 PROBLEM — Z98.890 PONV (POSTOPERATIVE NAUSEA AND VOMITING): Status: RESOLVED | Noted: 2025-08-13 | Resolved: 2025-08-14

## 2025-08-14 LAB
ANION GAP SERPL CALC-SCNC: 11 MMOL/L (ref 10–20)
BUN SERPL-MCNC: 15 MG/DL (ref 6–23)
CALCIUM SERPL-MCNC: 9.2 MG/DL (ref 8.6–10.3)
CHLORIDE SERPL-SCNC: 106 MMOL/L (ref 98–107)
CO2 SERPL-SCNC: 28 MMOL/L (ref 21–32)
CREAT SERPL-MCNC: 0.84 MG/DL (ref 0.5–1.05)
EGFRCR SERPLBLD CKD-EPI 2021: 73 ML/MIN/1.73M*2
ERYTHROCYTE [DISTWIDTH] IN BLOOD BY AUTOMATED COUNT: 11.7 % (ref 11.5–14.5)
GLUCOSE SERPL-MCNC: 130 MG/DL (ref 74–99)
HCT VFR BLD AUTO: 35.2 % (ref 36–46)
HGB BLD-MCNC: 11.8 G/DL (ref 12–16)
MCH RBC QN AUTO: 31.4 PG (ref 26–34)
MCHC RBC AUTO-ENTMCNC: 33.5 G/DL (ref 32–36)
MCV RBC AUTO: 94 FL (ref 80–100)
NRBC BLD-RTO: ABNORMAL /100{WBCS}
PLATELET # BLD AUTO: 163 X10*3/UL (ref 150–450)
POTASSIUM SERPL-SCNC: 4.5 MMOL/L (ref 3.5–5.3)
RBC # BLD AUTO: 3.76 X10*6/UL (ref 4–5.2)
SODIUM SERPL-SCNC: 140 MMOL/L (ref 136–145)
WBC # BLD AUTO: 11.8 X10*3/UL (ref 4.4–11.3)

## 2025-08-14 PROCEDURE — 80048 BASIC METABOLIC PNL TOTAL CA: CPT | Performed by: STUDENT IN AN ORGANIZED HEALTH CARE EDUCATION/TRAINING PROGRAM

## 2025-08-14 PROCEDURE — 2500000005 HC RX 250 GENERAL PHARMACY W/O HCPCS: Performed by: STUDENT IN AN ORGANIZED HEALTH CARE EDUCATION/TRAINING PROGRAM

## 2025-08-14 PROCEDURE — 2500000001 HC RX 250 WO HCPCS SELF ADMINISTERED DRUGS (ALT 637 FOR MEDICARE OP): Performed by: STUDENT IN AN ORGANIZED HEALTH CARE EDUCATION/TRAINING PROGRAM

## 2025-08-14 PROCEDURE — 2500000004 HC RX 250 GENERAL PHARMACY W/ HCPCS (ALT 636 FOR OP/ED): Mod: JZ | Performed by: STUDENT IN AN ORGANIZED HEALTH CARE EDUCATION/TRAINING PROGRAM

## 2025-08-14 PROCEDURE — 97530 THERAPEUTIC ACTIVITIES: CPT | Mod: GP

## 2025-08-14 PROCEDURE — 7100000011 HC EXTENDED STAY RECOVERY HOURLY - NURSING UNIT

## 2025-08-14 PROCEDURE — 97116 GAIT TRAINING THERAPY: CPT | Mod: GP

## 2025-08-14 PROCEDURE — 99232 SBSQ HOSP IP/OBS MODERATE 35: CPT | Performed by: STUDENT IN AN ORGANIZED HEALTH CARE EDUCATION/TRAINING PROGRAM

## 2025-08-14 PROCEDURE — 36415 COLL VENOUS BLD VENIPUNCTURE: CPT | Performed by: STUDENT IN AN ORGANIZED HEALTH CARE EDUCATION/TRAINING PROGRAM

## 2025-08-14 PROCEDURE — 85027 COMPLETE CBC AUTOMATED: CPT | Performed by: STUDENT IN AN ORGANIZED HEALTH CARE EDUCATION/TRAINING PROGRAM

## 2025-08-14 RX ADMIN — ASPIRIN 81 MG: 81 TABLET, COATED ORAL at 09:02

## 2025-08-14 RX ADMIN — CALCIUM 1 TABLET: 500 TABLET ORAL at 09:00

## 2025-08-14 RX ADMIN — DOCUSATE SODIUM 100 MG: 100 CAPSULE, LIQUID FILLED ORAL at 09:03

## 2025-08-14 RX ADMIN — PANTOPRAZOLE SODIUM 40 MG: 40 TABLET, DELAYED RELEASE ORAL at 06:10

## 2025-08-14 RX ADMIN — Medication 1 TABLET: at 09:02

## 2025-08-14 RX ADMIN — LIDOCAINE 4% 1 PATCH: 40 PATCH TOPICAL at 09:06

## 2025-08-14 RX ADMIN — CEFADROXIL 500 MG: 500 CAPSULE ORAL at 09:02

## 2025-08-14 RX ADMIN — POLYETHYLENE GLYCOL 3350 17 G: 17 POWDER, FOR SOLUTION ORAL at 09:04

## 2025-08-14 RX ADMIN — KETOROLAC TROMETHAMINE 15 MG: 15 INJECTION, SOLUTION INTRAMUSCULAR; INTRAVENOUS at 04:26

## 2025-08-14 RX ADMIN — Medication 25 MCG: at 09:02

## 2025-08-14 RX ADMIN — PREGABALIN 75 MG: 75 CAPSULE ORAL at 09:03

## 2025-08-14 ASSESSMENT — COGNITIVE AND FUNCTIONAL STATUS - GENERAL
HELP NEEDED FOR BATHING: A LITTLE
TURNING FROM BACK TO SIDE WHILE IN FLAT BAD: A LITTLE
CLIMB 3 TO 5 STEPS WITH RAILING: A LITTLE
STANDING UP FROM CHAIR USING ARMS: A LITTLE
MOBILITY SCORE: 18
MOVING FROM LYING ON BACK TO SITTING ON SIDE OF FLAT BED WITH BEDRAILS: A LITTLE
DRESSING REGULAR LOWER BODY CLOTHING: A LITTLE
MOBILITY SCORE: 24
MOVING TO AND FROM BED TO CHAIR: A LITTLE
TOILETING: A LITTLE
DAILY ACTIVITIY SCORE: 21
WALKING IN HOSPITAL ROOM: A LITTLE

## 2025-08-14 ASSESSMENT — PAIN - FUNCTIONAL ASSESSMENT
PAIN_FUNCTIONAL_ASSESSMENT: 0-10

## 2025-08-14 ASSESSMENT — PAIN DESCRIPTION - ORIENTATION: ORIENTATION: RIGHT

## 2025-08-14 ASSESSMENT — PAIN SCALES - GENERAL
PAINLEVEL_OUTOF10: 3
PAINLEVEL_OUTOF10: 3
PAINLEVEL_OUTOF10: 1
PAINLEVEL_OUTOF10: 5 - MODERATE PAIN
PAINLEVEL_OUTOF10: 1
PAINLEVEL_OUTOF10: 5 - MODERATE PAIN
PAINLEVEL_OUTOF10: 2

## 2025-08-14 ASSESSMENT — PAIN DESCRIPTION - DESCRIPTORS
DESCRIPTORS: DULL
DESCRIPTORS: DULL;ACHING
DESCRIPTORS: DULL
DESCRIPTORS: ACHING

## 2025-08-14 ASSESSMENT — PAIN DESCRIPTION - LOCATION: LOCATION: KNEE

## 2025-08-15 ENCOUNTER — HOME CARE VISIT (OUTPATIENT)
Dept: HOME HEALTH SERVICES | Facility: HOME HEALTH | Age: 75
End: 2025-08-15
Payer: MEDICARE

## 2025-08-15 VITALS
DIASTOLIC BLOOD PRESSURE: 78 MMHG | HEIGHT: 69 IN | TEMPERATURE: 98.7 F | OXYGEN SATURATION: 95 % | SYSTOLIC BLOOD PRESSURE: 123 MMHG | RESPIRATION RATE: 16 BRPM | BODY MASS INDEX: 17.92 KG/M2 | WEIGHT: 121 LBS | HEART RATE: 68 BPM

## 2025-08-15 PROCEDURE — G0151 HHCP-SERV OF PT,EA 15 MIN: HCPCS | Mod: HHH

## 2025-08-15 SDOH — HEALTH STABILITY: PHYSICAL HEALTH: EXERCISE ACTIVITY: TKE, KNEE FLEX, SAQ, SLR, HS, QS

## 2025-08-15 SDOH — HEALTH STABILITY: PHYSICAL HEALTH: EXERCISE TYPE: TKA

## 2025-08-15 SDOH — HEALTH STABILITY: PHYSICAL HEALTH: PHYSICAL EXERCISE: 15

## 2025-08-15 SDOH — HEALTH STABILITY: PHYSICAL HEALTH: PHYSICAL EXERCISE: SIT, SUPINE

## 2025-08-15 SDOH — HEALTH STABILITY: PHYSICAL HEALTH: EXERCISE ACTIVITIES SETS: 3

## 2025-08-15 ASSESSMENT — ENCOUNTER SYMPTOMS
PAIN LOCATION - PAIN FREQUENCY: INTERMITTENT
PAIN: 1
PAIN LOCATION - PAIN QUALITY: ACHE
LOWEST PAIN SEVERITY IN PAST 24 HOURS: 1/10
PERSON REPORTING PAIN: PATIENT
AGGRESSION WITHIN DEFINED LIMITS: 1
MUSCLE WEAKNESS: 1
LIMITED RANGE OF MOTION: 1
SUBJECTIVE PAIN PROGRESSION: GRADUALLY IMPROVING
PAIN LOCATION: RIGHT KNEE
PAIN LOCATION - PAIN SEVERITY: 6/10
PAIN SEVERITY GOAL: 0/10
HIGHEST PAIN SEVERITY IN PAST 24 HOURS: 5/10
PAIN LOCATION - RELIEVING FACTORS: MIN
PAIN LOCATION - PAIN DURATION: MIN
PAIN LOCATION - EXACERBATING FACTORS: ROM
SLEEP QUALITY: ADEQUATE
ANGER WITHIN DEFINED LIMITS: 1

## 2025-08-15 ASSESSMENT — ACTIVITIES OF DAILY LIVING (ADL)
ENTERING_EXITING_HOME: MINIMUM ASSIST
AMBULATION_DISTANCE/DURATION_TOLERATED: 50 FT
PHYSICAL TRANSFERS ASSESSED: 1
OASIS_M1830: 03
AMBULATION ASSISTANCE ON FLAT SURFACES: 1
CURRENT_FUNCTION: INDEPENDENT

## 2025-08-18 ENCOUNTER — HOME CARE VISIT (OUTPATIENT)
Dept: HOME HEALTH SERVICES | Facility: HOME HEALTH | Age: 75
End: 2025-08-18
Payer: MEDICARE

## 2025-08-18 VITALS — OXYGEN SATURATION: 99 % | RESPIRATION RATE: 16 BRPM | HEART RATE: 72 BPM

## 2025-08-18 PROCEDURE — G0151 HHCP-SERV OF PT,EA 15 MIN: HCPCS | Mod: HHH

## 2025-08-18 PROCEDURE — G0152 HHCP-SERV OF OT,EA 15 MIN: HCPCS | Mod: HHH

## 2025-08-18 SDOH — HEALTH STABILITY: PHYSICAL HEALTH: PHYSICAL EXERCISE: 15

## 2025-08-18 SDOH — HEALTH STABILITY: PHYSICAL HEALTH: EXERCISE ACTIVITY: OPEN / CLOSED CHAIN

## 2025-08-18 SDOH — HEALTH STABILITY: PHYSICAL HEALTH: PHYSICAL EXERCISE: SIT, STAND SUPINE

## 2025-08-18 SDOH — HEALTH STABILITY: PHYSICAL HEALTH: EXERCISE TYPE: TKA

## 2025-08-18 ASSESSMENT — ENCOUNTER SYMPTOMS
PAIN LOCATION: RIGHT KNEE
PAIN LOCATION - PAIN FREQUENCY: INTERMITTENT
PAIN SEVERITY GOAL: 0/10
LIMITED RANGE OF MOTION: 1
PAIN LOCATION: RIGHT KNEE
PAIN LOCATION - PAIN SEVERITY: 5/10
PAIN LOCATION - PAIN QUALITY: ACHE
PAIN LOCATION - RELIEVING FACTORS: CP, MEDS
LOWEST PAIN SEVERITY IN PAST 24 HOURS: 1/10
LOWEST PAIN SEVERITY IN PAST 24 HOURS: 2/10
PAIN: 1
PAIN LOCATION - EXACERBATING FACTORS: MVT
SUBJECTIVE PAIN PROGRESSION: GRADUALLY IMPROVING
HIGHEST PAIN SEVERITY IN PAST 24 HOURS: 5/10
MUSCLE WEAKNESS: 1
PAIN LOCATION - PAIN DURATION: MIN
PERSON REPORTING PAIN: PATIENT
PERSON REPORTING PAIN: PATIENT
PAIN: 1
HIGHEST PAIN SEVERITY IN PAST 24 HOURS: 10/10

## 2025-08-18 ASSESSMENT — ACTIVITIES OF DAILY LIVING (ADL)
AMBULATION ASSISTANCE ON FLAT SURFACES: 1
TOILETING: 1
DRESSING_LB_CURRENT_FUNCTION: STAND BY ASSIST
PHYSICAL TRANSFERS ASSESSED: 1
TOILETING: SUPERVISION
CURRENT_FUNCTION: INDEPENDENT
AMBULATION_DISTANCE/DURATION_TOLERATED: 50 FT X2

## 2025-08-20 ENCOUNTER — HOME CARE VISIT (OUTPATIENT)
Dept: HOME HEALTH SERVICES | Facility: HOME HEALTH | Age: 75
End: 2025-08-20
Payer: MEDICARE

## 2025-08-20 VITALS — OXYGEN SATURATION: 95 % | TEMPERATURE: 98.5 F

## 2025-08-20 PROCEDURE — G0151 HHCP-SERV OF PT,EA 15 MIN: HCPCS | Mod: HHH

## 2025-08-20 SDOH — HEALTH STABILITY: PHYSICAL HEALTH: EXERCISE TYPE: TKA

## 2025-08-20 SDOH — HEALTH STABILITY: PHYSICAL HEALTH: EXERCISE ACTIVITIES SETS: 3

## 2025-08-20 SDOH — HEALTH STABILITY: PHYSICAL HEALTH: EXERCISE ACTIVITY: OPEN/ CLOSED CHAIN EX

## 2025-08-20 SDOH — HEALTH STABILITY: PHYSICAL HEALTH: PHYSICAL EXERCISE: SIT, SUPINE, STAND

## 2025-08-20 SDOH — HEALTH STABILITY: PHYSICAL HEALTH: PHYSICAL EXERCISE: 15

## 2025-08-20 ASSESSMENT — ACTIVITIES OF DAILY LIVING (ADL)
CURRENT_FUNCTION: INDEPENDENT
AMBULATION ASSISTANCE ON FLAT SURFACES: 1
AMBULATION_DISTANCE/DURATION_TOLERATED: 100 FT
PHYSICAL TRANSFERS ASSESSED: 1

## 2025-08-20 ASSESSMENT — ENCOUNTER SYMPTOMS
PAIN LOCATION - PAIN DURATION: MIN
PAIN LOCATION - PAIN SEVERITY: 5/10
PAIN LOCATION: RIGHT KNEE
LOWEST PAIN SEVERITY IN PAST 24 HOURS: 2/10
LIMITED RANGE OF MOTION: 1
PERSON REPORTING PAIN: PATIENT
MUSCLE WEAKNESS: 1
PAIN LOCATION - PAIN QUALITY: ACHE
PAIN SEVERITY GOAL: 0/10
SUBJECTIVE PAIN PROGRESSION: GRADUALLY IMPROVING
PAIN: 1
PAIN LOCATION - RELIEVING FACTORS: CP
PAIN LOCATION - PAIN FREQUENCY: INTERMITTENT
PAIN LOCATION - EXACERBATING FACTORS: MVT
HIGHEST PAIN SEVERITY IN PAST 24 HOURS: 5/10

## 2025-08-25 ENCOUNTER — HOME CARE VISIT (OUTPATIENT)
Dept: HOME HEALTH SERVICES | Facility: HOME HEALTH | Age: 75
End: 2025-08-25
Payer: MEDICARE

## 2025-08-25 VITALS — RESPIRATION RATE: 14 BRPM

## 2025-08-25 PROCEDURE — G0151 HHCP-SERV OF PT,EA 15 MIN: HCPCS | Mod: HHH

## 2025-08-25 SDOH — HEALTH STABILITY: PHYSICAL HEALTH: EXERCISE ACTIVITY: OPEN/ CLOSED CHAIN EX 10-15 REPS

## 2025-08-25 SDOH — HEALTH STABILITY: PHYSICAL HEALTH: EXERCISE TYPE: TKA

## 2025-08-25 ASSESSMENT — ENCOUNTER SYMPTOMS
PERSON REPORTING PAIN: PATIENT
PAIN LOCATION - EXACERBATING FACTORS: MVT
PAIN LOCATION - PAIN QUALITY: ACHE
PAIN LOCATION - RELIEVING FACTORS: CP, MEDS
PAIN LOCATION - PAIN FREQUENCY: INTERMITTENT
LOWEST PAIN SEVERITY IN PAST 24 HOURS: 1/10
LIMITED RANGE OF MOTION: 1
SUBJECTIVE PAIN PROGRESSION: GRADUALLY IMPROVING
MUSCLE WEAKNESS: 1
PAIN LOCATION: RIGHT KNEE
PAIN SEVERITY GOAL: 0/10
PAIN LOCATION - PAIN DURATION: MIN
PAIN LOCATION - PAIN SEVERITY: 5/10
PAIN: 1
HIGHEST PAIN SEVERITY IN PAST 24 HOURS: 5/10

## 2025-08-25 ASSESSMENT — ACTIVITIES OF DAILY LIVING (ADL)
AMBULATION ASSISTANCE: 1
AMBULATION_DISTANCE/DURATION_TOLERATED: 100 FT
AMBULATION ASSISTANCE ON FLAT SURFACES: 1

## 2025-08-27 ENCOUNTER — HOME CARE VISIT (OUTPATIENT)
Dept: HOME HEALTH SERVICES | Facility: HOME HEALTH | Age: 75
End: 2025-08-27
Payer: MEDICARE

## 2025-08-27 VITALS — RESPIRATION RATE: 16 BRPM

## 2025-08-27 PROCEDURE — G0151 HHCP-SERV OF PT,EA 15 MIN: HCPCS | Mod: HHH

## 2025-08-27 SDOH — HEALTH STABILITY: PHYSICAL HEALTH: EXERCISE TYPE: TKA

## 2025-08-27 SDOH — HEALTH STABILITY: PHYSICAL HEALTH: PHYSICAL EXERCISE: SIT, STAND, SUPINE

## 2025-08-27 SDOH — HEALTH STABILITY: PHYSICAL HEALTH: EXERCISE ACTIVITIES SETS: 3

## 2025-08-27 SDOH — HEALTH STABILITY: PHYSICAL HEALTH: PHYSICAL EXERCISE: 15

## 2025-08-27 SDOH — HEALTH STABILITY: PHYSICAL HEALTH: EXERCISE ACTIVITY: OPEN/ CLOSED CHAIN

## 2025-08-27 ASSESSMENT — ENCOUNTER SYMPTOMS
PAIN LOCATION - PAIN FREQUENCY: INTERMITTENT
LOWEST PAIN SEVERITY IN PAST 24 HOURS: 1/10
PAIN LOCATION - PAIN QUALITY: ACHE
PAIN LOCATION - RELIEVING FACTORS: CP, MEDS
HIGHEST PAIN SEVERITY IN PAST 24 HOURS: 4/10
SUBJECTIVE PAIN PROGRESSION: GRADUALLY IMPROVING
MUSCLE WEAKNESS: 1
LIMITED RANGE OF MOTION: 1
PAIN LOCATION: RIGHT KNEE
PAIN SEVERITY GOAL: 0/10
PAIN LOCATION - PAIN DURATION: MIN
PAIN LOCATION - PAIN SEVERITY: 4/10
PERSON REPORTING PAIN: PATIENT
PAIN LOCATION - EXACERBATING FACTORS: MVT
PAIN: 1

## 2025-09-02 ENCOUNTER — HOME CARE VISIT (OUTPATIENT)
Dept: HOME HEALTH SERVICES | Facility: HOME HEALTH | Age: 75
End: 2025-09-02
Payer: MEDICARE

## 2025-09-02 VITALS — RESPIRATION RATE: 16 BRPM

## 2025-09-02 PROCEDURE — G0151 HHCP-SERV OF PT,EA 15 MIN: HCPCS | Mod: HHH

## 2025-09-02 SDOH — HEALTH STABILITY: PHYSICAL HEALTH: EXERCISE TYPE: TKA

## 2025-09-02 SDOH — HEALTH STABILITY: PHYSICAL HEALTH: EXERCISE ACTIVITY: OPEN/ CLOSED  CHAIN

## 2025-09-02 SDOH — HEALTH STABILITY: PHYSICAL HEALTH: PHYSICAL EXERCISE: 15

## 2025-09-02 SDOH — HEALTH STABILITY: PHYSICAL HEALTH: EXERCISE ACTIVITIES SETS: 2

## 2025-09-02 ASSESSMENT — ENCOUNTER SYMPTOMS
PAIN LOCATION: RIGHT KNEE
MUSCLE WEAKNESS: 1
PAIN LOCATION - PAIN SEVERITY: 3/10
PAIN LOCATION - PAIN FREQUENCY: INTERMITTENT
LOWEST PAIN SEVERITY IN PAST 24 HOURS: 1/10
PERSON REPORTING PAIN: PATIENT
PAIN: 1
PAIN LOCATION - PAIN QUALITY: ACHE
HIGHEST PAIN SEVERITY IN PAST 24 HOURS: 3/10
PAIN LOCATION - RELIEVING FACTORS: CP, MEDS
PAIN SEVERITY GOAL: 0/10
PAIN LOCATION - PAIN DURATION: MIN
PAIN LOCATION - EXACERBATING FACTORS: ROM
LIMITED RANGE OF MOTION: 1

## 2025-09-02 ASSESSMENT — ACTIVITIES OF DAILY LIVING (ADL)
HOME_HEALTH_OASIS: 00
CURRENT_FUNCTION: INDEPENDENT
AMBULATION ASSISTANCE ON FLAT SURFACES: 1
AMBULATION_DISTANCE/DURATION_TOLERATED: 100 FT
PHYSICAL TRANSFERS ASSESSED: 1
OASIS_M1830: 00

## 2025-09-04 ENCOUNTER — APPOINTMENT (OUTPATIENT)
Dept: ORTHOPEDIC SURGERY | Facility: CLINIC | Age: 75
End: 2025-09-04
Payer: MEDICARE

## 2025-09-04 ASSESSMENT — PAIN - FUNCTIONAL ASSESSMENT: PAIN_FUNCTIONAL_ASSESSMENT: 0-10

## 2025-09-04 ASSESSMENT — PAIN SCALES - GENERAL: PAINLEVEL_OUTOF10: 5 - MODERATE PAIN

## 2025-10-01 ENCOUNTER — APPOINTMENT (OUTPATIENT)
Dept: RADIOLOGY | Facility: CLINIC | Age: 75
End: 2025-10-01
Payer: MEDICARE

## 2025-10-02 ENCOUNTER — APPOINTMENT (OUTPATIENT)
Dept: ORTHOPEDIC SURGERY | Facility: CLINIC | Age: 75
End: 2025-10-02
Payer: MEDICARE

## 2025-12-04 ENCOUNTER — APPOINTMENT (OUTPATIENT)
Dept: ORTHOPEDIC SURGERY | Facility: CLINIC | Age: 75
End: 2025-12-04
Payer: MEDICARE

## (undated) DEVICE — PREP, SKIN, BETADINE, SCRUB, 1 GA

## (undated) DEVICE — BLADE, MAKO, SAGITTAL, NARROW

## (undated) DEVICE — TRACKING KIT, TM KNEE PROCEDURES, VIZADISC

## (undated) DEVICE — RESTRAINT, WRIST, XLONG, DISPOSABLE

## (undated) DEVICE — Device

## (undated) DEVICE — GLOVE, SURGICAL, PROTEXIS PI BLUE W/NEUTHERA, 8.0, PF, LF

## (undated) DEVICE — SUTURE, VICRYL, 2-0, 18 IN CP-2, UNDYED

## (undated) DEVICE — BITE BLOCK, SOFT, MOUTH, 3/4 X 4, LARGE, WHITE

## (undated) DEVICE — HOOD, SURGICAL, FLYTE HYBRID

## (undated) DEVICE — CATHETER, SUCTION, CATH-N-GLOVE, PEEL POUCH, 18 FR

## (undated) DEVICE — DUAL CUT SAGITTAL BLADE

## (undated) DEVICE — SUTURE, STRATAFIX, 3-0 MONOCRYL PLUS, PS-2 SPIRAL UNDYED

## (undated) DEVICE — GOWN, SURGICAL, ECLIPSE, FABRIC, 2XL, REINFORCED

## (undated) DEVICE — PROTECTIVE, FOOT, FOAM PAD & COHESIVE WRAP, STERILE

## (undated) DEVICE — DRAPE, INSTRUMENT, W/POUCH, STERI DRAPE, 7 X 11 IN, DISPOSABLE, STERILE

## (undated) DEVICE — GLOVE, SURGICAL, PROTEXIS PI , 7.5, PF, LF

## (undated) DEVICE — IRRIGATION SYSTEM, WOUND, PULSAVAC PLUS

## (undated) DEVICE — SUTURE, ETHILON, 3-0, 18 IN, PS1, BLACK

## (undated) DEVICE — SUTURE, ETHIBOND, P2, V-37, 30 IN, GREEN

## (undated) DEVICE — SUTURE, VICRYL, 1, 36 IN, CT-1, UNDYED

## (undated) DEVICE — CUFF, TOURNIQUET, 30 X 4, DUAL PORT/SNGL BLADDER, DISP, LF

## (undated) DEVICE — BONE PIN, 3.2MM X 110MM, STERILE

## (undated) DEVICE — SOLUTION, IRRIGATION, X RX SODIUM CHL 0.9%, 1000ML BTL

## (undated) DEVICE — DRESSING, FOAM, MEPILEX LITE, 4 X 4

## (undated) DEVICE — CEMENT, MIXEVAC III, 10S BOWL, KNEES

## (undated) DEVICE — MASK, AURASTRAIGHT, LARYN, SIZE 5

## (undated) DEVICE — CHECKPOINT KIT, FEMORAL/ TIBIAL

## (undated) DEVICE — MAKO DRAPE KIT

## (undated) DEVICE — BONE PINS (3.2MM X 140MM)

## (undated) DEVICE — SOLUTION, POVIDONE IODINE 10%, 0.75 OZ, NS

## (undated) DEVICE — BANDAGE, COFLEX, 6 X 5 YDS, TAN, STERILE, LF

## (undated) DEVICE — SOLUTION, INJECTION, USP, LACTATED RINGERS, LIFECARE, 1000ML